# Patient Record
Sex: MALE | Race: WHITE | NOT HISPANIC OR LATINO | Employment: FULL TIME | URBAN - METROPOLITAN AREA
[De-identification: names, ages, dates, MRNs, and addresses within clinical notes are randomized per-mention and may not be internally consistent; named-entity substitution may affect disease eponyms.]

---

## 2023-02-22 PROBLEM — M54.10 RADICULAR LOW BACK PAIN: Status: ACTIVE | Noted: 2023-02-22

## 2023-02-22 PROBLEM — R49.0 HOARSENESS: Status: ACTIVE | Noted: 2023-02-22

## 2023-02-22 PROBLEM — R91.8 LUNG NODULES: Status: ACTIVE | Noted: 2023-02-22

## 2023-02-22 PROBLEM — M62.89 MUSCLE TIGHTNESS: Status: ACTIVE | Noted: 2023-02-22

## 2023-02-22 PROBLEM — E78.5 HLD (HYPERLIPIDEMIA): Status: ACTIVE | Noted: 2023-02-22

## 2023-02-22 PROBLEM — K21.9 GERD (GASTROESOPHAGEAL REFLUX DISEASE): Status: ACTIVE | Noted: 2023-02-22

## 2023-02-22 PROBLEM — S06.0XAA CONCUSSION: Status: ACTIVE | Noted: 2023-02-22

## 2023-02-22 PROBLEM — S29.012A STRAIN OF THORACIC SPINE: Status: ACTIVE | Noted: 2023-02-22

## 2023-02-22 PROBLEM — L98.9 SKIN LESION: Status: ACTIVE | Noted: 2023-02-22

## 2023-02-22 PROBLEM — M19.90 ARTHRITIS: Status: ACTIVE | Noted: 2023-02-22

## 2023-02-22 PROBLEM — H61.23 BILATERAL IMPACTED CERUMEN: Status: ACTIVE | Noted: 2023-02-22

## 2023-02-22 PROBLEM — R97.20 ELEVATED PSA: Status: ACTIVE | Noted: 2023-02-22

## 2023-02-22 PROBLEM — R25.2 SPASM: Status: ACTIVE | Noted: 2023-02-22

## 2023-02-22 PROBLEM — R42 LOSS OF EQUILIBRIUM: Status: ACTIVE | Noted: 2023-02-22

## 2023-02-22 PROBLEM — R52 PAIN AGGRAVATED BY WALKING: Status: ACTIVE | Noted: 2023-02-22

## 2023-02-22 PROBLEM — G57.10 MERALGIA PARESTHETICA: Status: ACTIVE | Noted: 2023-02-22

## 2023-02-22 PROBLEM — S09.90XA HEAD INJURY: Status: ACTIVE | Noted: 2023-02-22

## 2023-02-22 PROBLEM — R74.8 ELEVATED LIVER ENZYMES: Status: ACTIVE | Noted: 2023-02-22

## 2023-02-22 PROBLEM — M54.16 LUMBAR RADICULOPATHY, ACUTE: Status: ACTIVE | Noted: 2023-02-22

## 2023-02-22 PROBLEM — I73.9 PERIPHERAL VASCULAR DISEASE, UNSPECIFIED (CMS-HCC): Status: ACTIVE | Noted: 2023-02-22

## 2023-02-22 PROBLEM — N52.9 ERECTILE DYSFUNCTION: Status: ACTIVE | Noted: 2023-02-22

## 2023-02-22 PROBLEM — G47.33 OSA ON CPAP: Status: ACTIVE | Noted: 2023-02-22

## 2023-02-22 PROBLEM — R22.9 CALCIFIED NODULE: Status: ACTIVE | Noted: 2023-02-22

## 2023-02-22 PROBLEM — R30.0 DYSURIA: Status: ACTIVE | Noted: 2023-02-22

## 2023-02-22 PROBLEM — I25.10 CAD (CORONARY ARTERY DISEASE): Status: ACTIVE | Noted: 2023-02-22

## 2023-02-22 PROBLEM — I10 HYPERTENSION: Status: ACTIVE | Noted: 2023-02-22

## 2023-02-22 PROBLEM — E66.9 OBESITY (BMI 30-39.9): Status: ACTIVE | Noted: 2023-02-22

## 2023-02-22 PROBLEM — H81.10 BPV (BENIGN POSITIONAL VERTIGO): Status: ACTIVE | Noted: 2023-02-22

## 2023-02-22 PROBLEM — L30.9 HAND ECZEMA: Status: ACTIVE | Noted: 2023-02-22

## 2023-02-22 PROBLEM — R55 SYNCOPE: Status: ACTIVE | Noted: 2023-02-22

## 2023-02-22 PROBLEM — R93.1 ELEVATED CORONARY ARTERY CALCIUM SCORE: Status: ACTIVE | Noted: 2023-02-22

## 2023-02-22 PROBLEM — M54.59 OTHER LOW BACK PAIN: Status: ACTIVE | Noted: 2023-02-22

## 2023-02-22 PROBLEM — M62.830 SPASM OF LUMBAR PARASPINOUS MUSCLE: Status: ACTIVE | Noted: 2023-02-22

## 2023-02-22 PROBLEM — R79.89 ELEVATED LFTS: Status: ACTIVE | Noted: 2023-02-22

## 2023-02-22 RX ORDER — BETAMETHASONE DIPROPIONATE 0.5 MG/G
CREAM TOPICAL 2 TIMES DAILY
COMMUNITY
Start: 2019-11-05

## 2023-02-22 RX ORDER — ATORVASTATIN CALCIUM 80 MG/1
1 TABLET, FILM COATED ORAL DAILY
COMMUNITY
Start: 2019-10-23 | End: 2023-10-25 | Stop reason: SDUPTHER

## 2023-02-22 RX ORDER — LISINOPRIL 10 MG/1
1 TABLET ORAL DAILY
COMMUNITY
Start: 2020-02-04 | End: 2023-10-25 | Stop reason: DRUGHIGH

## 2023-02-22 RX ORDER — OMEPRAZOLE 20 MG/1
CAPSULE, DELAYED RELEASE ORAL
COMMUNITY
Start: 2020-09-30

## 2023-02-22 RX ORDER — MELOXICAM 15 MG/1
1 TABLET ORAL DAILY
COMMUNITY
Start: 2022-10-04 | End: 2023-04-13 | Stop reason: ALTCHOICE

## 2023-02-22 RX ORDER — NAPROXEN SODIUM 220 MG/1
1 TABLET, FILM COATED ORAL DAILY
COMMUNITY

## 2023-02-22 RX ORDER — CYCLOBENZAPRINE HCL 10 MG
1 TABLET ORAL 3 TIMES DAILY PRN
COMMUNITY
Start: 2022-03-22 | End: 2024-04-29 | Stop reason: SDUPTHER

## 2023-04-13 ENCOUNTER — OFFICE VISIT (OUTPATIENT)
Dept: PRIMARY CARE | Facility: CLINIC | Age: 68
End: 2023-04-13
Payer: COMMERCIAL

## 2023-04-13 VITALS
OXYGEN SATURATION: 97 % | HEIGHT: 74 IN | RESPIRATION RATE: 14 BRPM | HEART RATE: 62 BPM | WEIGHT: 307 LBS | DIASTOLIC BLOOD PRESSURE: 82 MMHG | TEMPERATURE: 97.3 F | SYSTOLIC BLOOD PRESSURE: 120 MMHG | BODY MASS INDEX: 39.4 KG/M2

## 2023-04-13 DIAGNOSIS — I73.9 PERIPHERAL VASCULAR DISEASE, UNSPECIFIED (CMS-HCC): ICD-10-CM

## 2023-04-13 DIAGNOSIS — G47.33 OSA ON CPAP: ICD-10-CM

## 2023-04-13 DIAGNOSIS — R25.2 MUSCLE CRAMPS: ICD-10-CM

## 2023-04-13 DIAGNOSIS — Z12.5 PROSTATE CANCER SCREENING: ICD-10-CM

## 2023-04-13 DIAGNOSIS — Z00.00 ROUTINE GENERAL MEDICAL EXAMINATION AT HEALTH CARE FACILITY: ICD-10-CM

## 2023-04-13 DIAGNOSIS — Z12.11 COLON CANCER SCREENING: ICD-10-CM

## 2023-04-13 DIAGNOSIS — I25.10 CORONARY ARTERY DISEASE DUE TO LIPID RICH PLAQUE: ICD-10-CM

## 2023-04-13 DIAGNOSIS — E66.01 CLASS 2 SEVERE OBESITY DUE TO EXCESS CALORIES WITH SERIOUS COMORBIDITY AND BODY MASS INDEX (BMI) OF 39.0 TO 39.9 IN ADULT (MULTI): ICD-10-CM

## 2023-04-13 DIAGNOSIS — Z00.00 WELLNESS EXAMINATION: Primary | ICD-10-CM

## 2023-04-13 DIAGNOSIS — I25.83 CORONARY ARTERY DISEASE DUE TO LIPID RICH PLAQUE: ICD-10-CM

## 2023-04-13 PROBLEM — N52.9 ERECTILE DYSFUNCTION: Status: RESOLVED | Noted: 2023-02-22 | Resolved: 2023-04-13

## 2023-04-13 PROBLEM — S06.0XAA CONCUSSION: Status: RESOLVED | Noted: 2023-02-22 | Resolved: 2023-04-13

## 2023-04-13 PROBLEM — R79.89 ELEVATED LFTS: Status: RESOLVED | Noted: 2023-02-22 | Resolved: 2023-04-13

## 2023-04-13 PROBLEM — R42 LOSS OF EQUILIBRIUM: Status: RESOLVED | Noted: 2023-02-22 | Resolved: 2023-04-13

## 2023-04-13 PROBLEM — R52 PAIN AGGRAVATED BY WALKING: Status: RESOLVED | Noted: 2023-02-22 | Resolved: 2023-04-13

## 2023-04-13 PROBLEM — E66.9 OBESITY (BMI 30-39.9): Status: RESOLVED | Noted: 2023-02-22 | Resolved: 2023-04-13

## 2023-04-13 PROBLEM — R30.0 DYSURIA: Status: RESOLVED | Noted: 2023-02-22 | Resolved: 2023-04-13

## 2023-04-13 PROBLEM — M48.10 DISH (DIFFUSE IDIOPATHIC SKELETAL HYPEROSTOSIS): Status: ACTIVE | Noted: 2023-04-13

## 2023-04-13 PROBLEM — R97.20 ELEVATED PSA: Status: RESOLVED | Noted: 2023-02-22 | Resolved: 2023-04-13

## 2023-04-13 PROBLEM — M62.89 MUSCLE TIGHTNESS: Status: RESOLVED | Noted: 2023-02-22 | Resolved: 2023-04-13

## 2023-04-13 PROBLEM — R49.0 HOARSENESS: Status: RESOLVED | Noted: 2023-02-22 | Resolved: 2023-04-13

## 2023-04-13 PROBLEM — R55 SYNCOPE: Status: RESOLVED | Noted: 2023-02-22 | Resolved: 2023-04-13

## 2023-04-13 PROBLEM — L98.9 SKIN LESION: Status: RESOLVED | Noted: 2023-02-22 | Resolved: 2023-04-13

## 2023-04-13 PROBLEM — M54.16 LUMBAR RADICULOPATHY, ACUTE: Status: RESOLVED | Noted: 2023-02-22 | Resolved: 2023-04-13

## 2023-04-13 PROBLEM — H61.23 BILATERAL IMPACTED CERUMEN: Status: RESOLVED | Noted: 2023-02-22 | Resolved: 2023-04-13

## 2023-04-13 PROBLEM — R22.9 CALCIFIED NODULE: Status: RESOLVED | Noted: 2023-02-22 | Resolved: 2023-04-13

## 2023-04-13 PROBLEM — R74.8 ELEVATED LIVER ENZYMES: Status: RESOLVED | Noted: 2023-02-22 | Resolved: 2023-04-13

## 2023-04-13 PROBLEM — M54.59 OTHER LOW BACK PAIN: Status: RESOLVED | Noted: 2023-02-22 | Resolved: 2023-04-13

## 2023-04-13 PROBLEM — M54.10 RADICULAR LOW BACK PAIN: Status: RESOLVED | Noted: 2023-02-22 | Resolved: 2023-04-13

## 2023-04-13 PROBLEM — R91.8 LUNG NODULES: Status: RESOLVED | Noted: 2023-02-22 | Resolved: 2023-04-13

## 2023-04-13 PROCEDURE — 3074F SYST BP LT 130 MM HG: CPT | Performed by: INTERNAL MEDICINE

## 2023-04-13 PROCEDURE — 1160F RVW MEDS BY RX/DR IN RCRD: CPT | Performed by: INTERNAL MEDICINE

## 2023-04-13 PROCEDURE — 1036F TOBACCO NON-USER: CPT | Performed by: INTERNAL MEDICINE

## 2023-04-13 PROCEDURE — 1159F MED LIST DOCD IN RCRD: CPT | Performed by: INTERNAL MEDICINE

## 2023-04-13 PROCEDURE — 99397 PER PM REEVAL EST PAT 65+ YR: CPT | Performed by: INTERNAL MEDICINE

## 2023-04-13 PROCEDURE — 1170F FXNL STATUS ASSESSED: CPT | Performed by: INTERNAL MEDICINE

## 2023-04-13 PROCEDURE — 3008F BODY MASS INDEX DOCD: CPT | Performed by: INTERNAL MEDICINE

## 2023-04-13 PROCEDURE — 3079F DIAST BP 80-89 MM HG: CPT | Performed by: INTERNAL MEDICINE

## 2023-04-13 ASSESSMENT — PATIENT HEALTH QUESTIONNAIRE - PHQ9
2. FEELING DOWN, DEPRESSED OR HOPELESS: NOT AT ALL
10. IF YOU CHECKED OFF ANY PROBLEMS, HOW DIFFICULT HAVE THESE PROBLEMS MADE IT FOR YOU TO DO YOUR WORK, TAKE CARE OF THINGS AT HOME, OR GET ALONG WITH OTHER PEOPLE: SOMEWHAT DIFFICULT
1. LITTLE INTEREST OR PLEASURE IN DOING THINGS: NOT AT ALL
SUM OF ALL RESPONSES TO PHQ9 QUESTIONS 1 AND 2: 1
SUM OF ALL RESPONSES TO PHQ9 QUESTIONS 1 AND 2: 1
2. FEELING DOWN, DEPRESSED OR HOPELESS: SEVERAL DAYS
1. LITTLE INTEREST OR PLEASURE IN DOING THINGS: SEVERAL DAYS

## 2023-04-13 ASSESSMENT — ACTIVITIES OF DAILY LIVING (ADL)
BATHING: INDEPENDENT
MANAGING_FINANCES: INDEPENDENT
TAKING_MEDICATION: INDEPENDENT
DRESSING: INDEPENDENT
GROCERY_SHOPPING: INDEPENDENT
DOING_HOUSEWORK: INDEPENDENT

## 2023-04-13 ASSESSMENT — ENCOUNTER SYMPTOMS
DEPRESSION: 1
LOSS OF SENSATION IN FEET: 1
OCCASIONAL FEELINGS OF UNSTEADINESS: 0

## 2023-04-13 NOTE — PATIENT INSTRUCTIONS
Check labs  Check magnesium  Start coenzyme q10  Colonoscopy  Call  with any problems or questions.   Follow up in 6 months

## 2023-04-13 NOTE — PROGRESS NOTES
"Subjective    Lj Bhardwaj is a 68 y.o. male who presents for Medicare Annual Wellness Visit Subsequent.  HPI  Up to date on vaccines/colonoscopy  He is not on medicare he has primary private insurance  Unsure if he has a living will   Has easy bleeding/bruising  C/o arthritis   Still going through vestibular rehab  No chest pain.  The physical therapy helped.      Uses cpap nightly  Review of Systems      Objective     /82 (BP Location: Left arm, Patient Position: Sitting, BP Cuff Size: Adult)   Pulse 62   Temp 36.3 °C (97.3 °F) (Skin)   Resp 14   Ht 1.88 m (6' 2\")   Wt 139 kg (307 lb)   SpO2 97%   BMI 39.42 kg/m²    Physical Exam  Vitals and nursing note reviewed.   Constitutional:       Appearance: Normal appearance.   Neck:      Thyroid: No thyroid mass or thyromegaly.   Cardiovascular:      Rate and Rhythm: Normal rate and regular rhythm.      Pulses: Normal pulses.      Heart sounds: Normal heart sounds.   Pulmonary:      Effort: Pulmonary effort is normal.      Breath sounds: Normal breath sounds.   Abdominal:      General: Abdomen is flat. Bowel sounds are normal.      Palpations: Abdomen is soft.   Musculoskeletal:      Cervical back: Normal range of motion and neck supple.   Neurological:      General: No focal deficit present.      Mental Status: He is alert.   Psychiatric:         Mood and Affect: Mood normal.       Health Maintenance Due   Topic Date Due    Yearly Adult Physical  Never done    Hepatitis C Screening  Never done    COVID-19 Vaccine (5 - Booster for Pfizer series) 11/11/2022          Assessment/Plan   Problem List Items Addressed This Visit          Nervous    NICOLAS on CPAP       Circulatory    Peripheral vascular disease, unspecified (CMS/HCC)     Other Visit Diagnoses       Wellness examination    -  Primary    Routine general medical examination at health care facility        Colon cancer screening        Muscle cramps            Stable based on symptoms. Continue " established treatment plan and follow up at least yearly  Recommend compression stalkings  Add coeznyme q 10  Pt states they are using >4 hours a night more than 70% of the nights. Pt has noticed a significant  Improvement in symptoms.   We discussed glp/gip therapy and dietary changes

## 2023-04-26 ENCOUNTER — TELEPHONE (OUTPATIENT)
Dept: PRIMARY CARE | Facility: CLINIC | Age: 68
End: 2023-04-26
Payer: COMMERCIAL

## 2023-05-05 ENCOUNTER — LAB (OUTPATIENT)
Dept: LAB | Facility: LAB | Age: 68
End: 2023-05-05
Payer: COMMERCIAL

## 2023-05-05 DIAGNOSIS — R25.2 MUSCLE CRAMPS: ICD-10-CM

## 2023-05-05 DIAGNOSIS — Z12.5 PROSTATE CANCER SCREENING: ICD-10-CM

## 2023-05-05 DIAGNOSIS — Z00.00 ROUTINE GENERAL MEDICAL EXAMINATION AT HEALTH CARE FACILITY: ICD-10-CM

## 2023-05-05 LAB
ALANINE AMINOTRANSFERASE (SGPT) (U/L) IN SER/PLAS: 48 U/L (ref 10–52)
ALBUMIN (G/DL) IN SER/PLAS: 4.3 G/DL (ref 3.4–5)
ALKALINE PHOSPHATASE (U/L) IN SER/PLAS: 79 U/L (ref 33–136)
ANION GAP IN SER/PLAS: 12 MMOL/L (ref 10–20)
ASPARTATE AMINOTRANSFERASE (SGOT) (U/L) IN SER/PLAS: 32 U/L (ref 9–39)
BILIRUBIN TOTAL (MG/DL) IN SER/PLAS: 1.1 MG/DL (ref 0–1.2)
CALCIUM (MG/DL) IN SER/PLAS: 9.3 MG/DL (ref 8.6–10.3)
CARBON DIOXIDE, TOTAL (MMOL/L) IN SER/PLAS: 28 MMOL/L (ref 21–32)
CHLORIDE (MMOL/L) IN SER/PLAS: 103 MMOL/L (ref 98–107)
CHOLESTEROL (MG/DL) IN SER/PLAS: 120 MG/DL (ref 0–199)
CHOLESTEROL IN HDL (MG/DL) IN SER/PLAS: 33.1 MG/DL
CHOLESTEROL/HDL RATIO: 3.6
CREATININE (MG/DL) IN SER/PLAS: 1.04 MG/DL (ref 0.5–1.3)
ERYTHROCYTE DISTRIBUTION WIDTH (RATIO) BY AUTOMATED COUNT: 13.3 % (ref 11.5–14.5)
ERYTHROCYTE MEAN CORPUSCULAR HEMOGLOBIN CONCENTRATION (G/DL) BY AUTOMATED: 31.9 G/DL (ref 32–36)
ERYTHROCYTE MEAN CORPUSCULAR VOLUME (FL) BY AUTOMATED COUNT: 89 FL (ref 80–100)
ERYTHROCYTES (10*6/UL) IN BLOOD BY AUTOMATED COUNT: 5.39 X10E12/L (ref 4.5–5.9)
GFR MALE: 78 ML/MIN/1.73M2
GLUCOSE (MG/DL) IN SER/PLAS: 86 MG/DL (ref 74–99)
HEMATOCRIT (%) IN BLOOD BY AUTOMATED COUNT: 47.9 % (ref 41–52)
HEMOGLOBIN (G/DL) IN BLOOD: 15.3 G/DL (ref 13.5–17.5)
LDL: 70 MG/DL (ref 0–99)
LEUKOCYTES (10*3/UL) IN BLOOD BY AUTOMATED COUNT: 6.4 X10E9/L (ref 4.4–11.3)
MAGNESIUM (MG/DL) IN SER/PLAS: 2.02 MG/DL (ref 1.6–2.4)
PLATELETS (10*3/UL) IN BLOOD AUTOMATED COUNT: 250 X10E9/L (ref 150–450)
POTASSIUM (MMOL/L) IN SER/PLAS: 4.5 MMOL/L (ref 3.5–5.3)
PROSTATE SPECIFIC AG (NG/ML) IN SER/PLAS: 1.23 NG/ML (ref 0–4)
PROTEIN TOTAL: 7.4 G/DL (ref 6.4–8.2)
SODIUM (MMOL/L) IN SER/PLAS: 138 MMOL/L (ref 136–145)
TRIGLYCERIDE (MG/DL) IN SER/PLAS: 85 MG/DL (ref 0–149)
UREA NITROGEN (MG/DL) IN SER/PLAS: 21 MG/DL (ref 6–23)
VLDL: 17 MG/DL (ref 0–40)

## 2023-05-05 PROCEDURE — 84153 ASSAY OF PSA TOTAL: CPT

## 2023-05-05 PROCEDURE — 85027 COMPLETE CBC AUTOMATED: CPT

## 2023-05-05 PROCEDURE — 80061 LIPID PANEL: CPT

## 2023-05-05 PROCEDURE — 36415 COLL VENOUS BLD VENIPUNCTURE: CPT

## 2023-05-05 PROCEDURE — 80053 COMPREHEN METABOLIC PANEL: CPT

## 2023-05-05 PROCEDURE — 83735 ASSAY OF MAGNESIUM: CPT

## 2023-05-08 NOTE — RESULT ENCOUNTER NOTE
His labs are good. His magnesium is ok so if he wants to try otc magnesium supplement for his muscle cramps he can see if that helps.

## 2023-05-09 ENCOUNTER — TELEPHONE (OUTPATIENT)
Dept: PRIMARY CARE | Facility: CLINIC | Age: 68
End: 2023-05-09

## 2023-05-09 NOTE — TELEPHONE ENCOUNTER
----- Message from China Townsend CMA sent at 5/8/2023  8:43 AM EDT -----    Lmtcb    ----- Message -----  From: Jennifer Weir DO  Sent: 5/8/2023   8:06 AM EDT  To: China Townsend CMA    His labs are good. His magnesium is ok so if he wants to try otc magnesium supplement for his muscle cramps he can see if that helps.

## 2023-10-25 ENCOUNTER — OFFICE VISIT (OUTPATIENT)
Dept: PRIMARY CARE | Facility: CLINIC | Age: 68
End: 2023-10-25
Payer: COMMERCIAL

## 2023-10-25 VITALS
SYSTOLIC BLOOD PRESSURE: 146 MMHG | DIASTOLIC BLOOD PRESSURE: 80 MMHG | BODY MASS INDEX: 37.99 KG/M2 | WEIGHT: 296 LBS | HEIGHT: 74 IN | TEMPERATURE: 97.3 F | RESPIRATION RATE: 14 BRPM | HEART RATE: 80 BPM

## 2023-10-25 DIAGNOSIS — M48.10 DISH (DIFFUSE IDIOPATHIC SKELETAL HYPEROSTOSIS): ICD-10-CM

## 2023-10-25 DIAGNOSIS — E78.2 MIXED HYPERLIPIDEMIA: ICD-10-CM

## 2023-10-25 DIAGNOSIS — R49.9 CHANGE IN VOICE: Primary | ICD-10-CM

## 2023-10-25 DIAGNOSIS — M25.551 PAIN OF RIGHT HIP: ICD-10-CM

## 2023-10-25 DIAGNOSIS — I10 PRIMARY HYPERTENSION: ICD-10-CM

## 2023-10-25 PROCEDURE — 99214 OFFICE O/P EST MOD 30 MIN: CPT | Performed by: INTERNAL MEDICINE

## 2023-10-25 PROCEDURE — 1160F RVW MEDS BY RX/DR IN RCRD: CPT | Performed by: INTERNAL MEDICINE

## 2023-10-25 PROCEDURE — 3079F DIAST BP 80-89 MM HG: CPT | Performed by: INTERNAL MEDICINE

## 2023-10-25 PROCEDURE — 3008F BODY MASS INDEX DOCD: CPT | Performed by: INTERNAL MEDICINE

## 2023-10-25 PROCEDURE — 3077F SYST BP >= 140 MM HG: CPT | Performed by: INTERNAL MEDICINE

## 2023-10-25 PROCEDURE — 1159F MED LIST DOCD IN RCRD: CPT | Performed by: INTERNAL MEDICINE

## 2023-10-25 PROCEDURE — 1036F TOBACCO NON-USER: CPT | Performed by: INTERNAL MEDICINE

## 2023-10-25 RX ORDER — LISINOPRIL 10 MG/1
10 TABLET ORAL DAILY
Qty: 90 TABLET | Refills: 3 | Status: CANCELLED | OUTPATIENT
Start: 2023-10-25 | End: 2024-10-24

## 2023-10-25 RX ORDER — ATORVASTATIN CALCIUM 80 MG/1
80 TABLET, FILM COATED ORAL DAILY
Qty: 90 TABLET | Refills: 3 | Status: SHIPPED | OUTPATIENT
Start: 2023-10-25 | End: 2024-01-23 | Stop reason: SDUPTHER

## 2023-10-25 RX ORDER — LISINOPRIL 40 MG/1
40 TABLET ORAL DAILY
Qty: 90 TABLET | Refills: 3 | Status: SHIPPED | OUTPATIENT
Start: 2023-10-25 | End: 2024-01-23 | Stop reason: SDUPTHER

## 2023-10-25 NOTE — PROGRESS NOTES
"Subjective    Lj Bhardwaj is a 68 y.o. male who presents for Hypertension.  HPI  6 month follow up htn  Continues to use CPAP nightly. Uses for naps as well.   He has no chest pain or sob.   His weight is up.   He never smoked    C/o back stiffness, right leg pain. Starts after being on feet for about 50 minutes. His  Low back gets tight and then goes around his right   He does     Concerns with voice changes. By afternoon voice sounds gravely   Needs referral for hearing test, audiologist.     Should he get COVID vaccine    Review of Systems   All other systems reviewed and are negative.        Objective     /80 (BP Location: Left arm, Patient Position: Sitting, BP Cuff Size: Adult)   Pulse 80   Temp 36.3 °C (97.3 °F) (Skin)   Resp 14   Ht 1.88 m (6' 2\")   Wt 134 kg (296 lb)   BMI 38.00 kg/m²    Physical Exam  Vitals reviewed.   Constitutional:       General: He is not in acute distress.     Appearance: Normal appearance.   Cardiovascular:      Rate and Rhythm: Normal rate and regular rhythm.      Pulses: Normal pulses.      Heart sounds: Normal heart sounds.   Pulmonary:      Effort: Pulmonary effort is normal.      Breath sounds: Normal breath sounds.   Abdominal:      Tenderness: There is no abdominal tenderness.   Musculoskeletal:         General: No swelling.      Comments: Right hip tenderness with external rotation.  Increase tension and tenderness   Skin:     General: Skin is warm and dry.   Neurological:      Mental Status: He is alert.       Health Maintenance Due   Topic Date Due    Hepatitis C Screening  Never done    Diabetes Screening  Never done    COVID-19 Vaccine (5 - Pfizer series) 11/11/2022          Assessment/Plan   Problem List Items Addressed This Visit       HLD (hyperlipidemia)    Relevant Medications    atorvastatin (Lipitor) 80 mg tablet    Hypertension    Relevant Medications    lisinopril 40 mg tablet    Other Relevant Orders    Basic Metabolic Panel    DISH (diffuse " idiopathic skeletal hyperostosis)    Relevant Orders    Referral to Physical Therapy     Other Visit Diagnoses       Change in voice    -  Primary    Relevant Orders    Referral to ENT        Long discussion on diet weight and blood pressure  Increase lisinopril to 40 recheck labs in  a month  Refer to ent for vocal cord evaluation  Recommend physical therapy  Check xray right hip

## 2023-10-30 ENCOUNTER — OFFICE VISIT (OUTPATIENT)
Dept: OTOLARYNGOLOGY | Facility: CLINIC | Age: 68
End: 2023-10-30
Payer: COMMERCIAL

## 2023-10-30 ENCOUNTER — ANCILLARY PROCEDURE (OUTPATIENT)
Dept: RADIOLOGY | Facility: CLINIC | Age: 68
End: 2023-10-30
Payer: COMMERCIAL

## 2023-10-30 VITALS — BODY MASS INDEX: 38.5 KG/M2 | WEIGHT: 300 LBS | HEIGHT: 74 IN

## 2023-10-30 DIAGNOSIS — M25.551 PAIN OF RIGHT HIP: ICD-10-CM

## 2023-10-30 DIAGNOSIS — R49.0 DYSPHONIA: Primary | ICD-10-CM

## 2023-10-30 DIAGNOSIS — J38.3 VOCAL FOLD ATROPHY: ICD-10-CM

## 2023-10-30 PROCEDURE — 99214 OFFICE O/P EST MOD 30 MIN: CPT | Performed by: OTOLARYNGOLOGY

## 2023-10-30 PROCEDURE — 73502 X-RAY EXAM HIP UNI 2-3 VIEWS: CPT | Mod: RIGHT SIDE | Performed by: RADIOLOGY

## 2023-10-30 PROCEDURE — 1160F RVW MEDS BY RX/DR IN RCRD: CPT | Performed by: OTOLARYNGOLOGY

## 2023-10-30 PROCEDURE — 73502 X-RAY EXAM HIP UNI 2-3 VIEWS: CPT | Mod: RT,FY

## 2023-10-30 PROCEDURE — 1159F MED LIST DOCD IN RCRD: CPT | Performed by: OTOLARYNGOLOGY

## 2023-10-30 PROCEDURE — 3008F BODY MASS INDEX DOCD: CPT | Performed by: OTOLARYNGOLOGY

## 2023-10-30 PROCEDURE — 31579 LARYNGOSCOPY TELESCOPIC: CPT | Performed by: OTOLARYNGOLOGY

## 2023-10-30 PROCEDURE — 1036F TOBACCO NON-USER: CPT | Performed by: OTOLARYNGOLOGY

## 2023-10-30 PROCEDURE — 99204 OFFICE O/P NEW MOD 45 MIN: CPT | Performed by: OTOLARYNGOLOGY

## 2023-10-30 ASSESSMENT — PATIENT HEALTH QUESTIONNAIRE - PHQ9
SUM OF ALL RESPONSES TO PHQ9 QUESTIONS 1 AND 2: 0
1. LITTLE INTEREST OR PLEASURE IN DOING THINGS: NOT AT ALL
2. FEELING DOWN, DEPRESSED OR HOPELESS: NOT AT ALL

## 2023-10-30 NOTE — PROGRESS NOTES
Patient: Lj Bhardwaj   MRN: [id number] YOB: 1955   Sex: male Age: 68 y.o.  Date of Service: 10/30/2023       ASSESSMENT AND PLAN  I discussed the findings with Lj Bhardwaj and have recommended the followin. Dysphonia in professional voice user, laryngoscopy significant for mild vocal fold atrophy, mild right VF hypomobility, muscle tension likely secondary. No masses or lesions.  - Hydration, humidification  - Personal steamer prior to broadcasts  - Refer to voice therapy with SLP  - Follow-up with me 3-4 months, can consider trial injection laryngoplasty if no improvements, further cross sectional imaging for paresis      CHIEF COMPLAINT  Chief Complaint   Patient presents with    New Patient Visit       HISTORY OF PRESENT ILLNESS  Lj Bhardwaj is a 68 y.o. male referred by Jennifer Vidales, * for evaluation of dysphonia.  The patient is a . 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 Going on for at least several months, may have been a little gradual, did not think much about it. His wife noticed it. Thinks he has been trying to be more forceful with his voice as he has noticed that his colleagues' voices have gotten a little thin with age. No pain when he speaks, has not lost his voice. Not otherwise talkative. No coughing. No dyspnea or dysphagia.     He has a dog at home. No issues with allergies now but did as a kid. History of NICOLAS on CPAP, longstanding. GERD well controlled on omeprazole. Non smoker.       ADDITIONAL HISTORY  Past Medical History  He has a past medical history of Personal history of colonic polyps (10/08/2020) and Personal history of other diseases of the musculoskeletal system and connective tissue (2020). Surgical History  He has a past surgical history that includes Other surgical history (10/06/2019).   Social History  He reports that he has never  "smoked. He has never used smokeless tobacco. He reports current alcohol use. He reports that he does not use drugs. Allergies  Latex     Family History  Family History   Problem Relation Name Age of Onset    Stroke Mother      Heart disease Father      Heart disease Brother          REVIEW OF SYSTEMS  All 10 systems were reviewed and negative except for above.      PHYSICAL EXAM  ENT Physical Exam   GENERAL: Well-nourished and developed, alert and appropriate, no distress, voice [id number]  RESPIRATORY: Breathing quietly, no stridor  EYES:  Pupils reactive, sclera clear, external ocular muscles intact, no nystagmus.    EARS:  Pinnae normal. External auditory canals clear and tympanic membranes intact.  NOSE:  No anterior lesions, masses or polyps.  ORAL CAVITY/OROPHARYNX:  Buccal mucosa is moist without lesions or masses, tongue midline and palate elevates symmetrically.  NECK:  Soft. There is no lymphadenopathy or thyromegaly.    NEUROLOGIC:  Cranial nerves II-XII grossly intact.       Last Recorded Vitals  Height 1.88 m (6' 2\"), weight 136 kg (300 lb).    RESULTS    Patient Reported Outcome Measures  N/A    Laboratory, Radiology, and Pathology  I personally reviewed the following results, with the following interpretation:   CT face/spine noncon 3/19/22 - no obvious masses or lesions but evaluated limited due to non contrasted scan      PROCEDURES  Procedures     Flexible Fiberoptic Laryngoscopy with Stroboscopy    Patient failed a mirror exam due to limitations of equipment and the need for stroboscopy to assess glottic vibration and closure.     PREOPERATIVE DIAGNOSIS: Dysphonia    POSTOPERATIVE DIAGNOSIS: Same    PROCEDURE:  Strobovideolaryngoscopy    ANESTHESIA:  Topical    COMPLICATIONS:  None    SPECIMENS:  None    PROCEDURE IN DETAIL: The patient was seated in an upright position.  The nasal cavity was topically decongested and anesthetized.  The stroboscopic microphone was held to the neck at the level of " the larynx.  The distal chip video laryngoscope was passed through the nasal cavity.  The nasal cavity and nasopharynx were within normal limits.  The base of tongue revealed no lesions or masses. The following findings on stroboscopy were noted:      Appearance of pharynx/larynx/Other Structural Lesions: small right vallecular cyst   Vocal Fold Mobility               Right VF: hypomobile               Left VF: mobile   TVF Appearance               Edema/Erythema: none               Lesions/vibratory margin irregularities: mild atrophy bilaterally, some mucus on superior surface   Glottic Closure Pattern: complete with hyperfunction    Vibration:               Phase: symmetric    Periodicity: regular                Amplitude: normal                Waveform: normal     Muscle Tension Patterns:  lateral false fold and AP   Other abnormal findings:     The patient tolerated the procedure well.     ----------------------------------------------------------------------  Shannan Tinsley MD, MAEd    [facility]  Department of Otolaryngology - Head and Neck Surgery  [facility]    The total time I spent in care of this patient today (excluding time spent on other billable services) is as follows:    Time Spent  Prep time on day of patient encounter: 10 minutes  Time spent directly with patient, family or caregiver: 25 minutes  Additional Time Spent on Patient Care Activities: 5 minutes  Documentation Time: 5 minutes  Other Time Spent: 0 minutes  Total: 45 minutes

## 2023-11-01 ENCOUNTER — TELEPHONE (OUTPATIENT)
Dept: PRIMARY CARE | Facility: CLINIC | Age: 68
End: 2023-11-01

## 2023-11-01 NOTE — TELEPHONE ENCOUNTER
----- Message from Jennifer Weir DO sent at 11/1/2023  8:15 AM EDT -----  His xrays shows mild bilateral hip arthritis

## 2023-11-08 ENCOUNTER — EVALUATION (OUTPATIENT)
Dept: SPEECH THERAPY | Facility: CLINIC | Age: 68
End: 2023-11-08
Payer: COMMERCIAL

## 2023-11-08 DIAGNOSIS — R49.9 HOARSENESS OR CHANGING VOICE: Primary | ICD-10-CM

## 2023-11-08 DIAGNOSIS — R49.0 DYSPHONIA: ICD-10-CM

## 2023-11-08 DIAGNOSIS — J38.3 VOCAL FOLD ATROPHY: ICD-10-CM

## 2023-11-08 DIAGNOSIS — R49.0 MUSCLE TENSION DYSPHONIA: ICD-10-CM

## 2023-11-08 PROCEDURE — 92524 BEHAVRAL QUALIT ANALYS VOICE: CPT | Mod: GN

## 2023-11-08 PROCEDURE — 92507 TX SP LANG VOICE COMM INDIV: CPT | Mod: GN

## 2023-11-08 ASSESSMENT — PAIN - FUNCTIONAL ASSESSMENT: PAIN_FUNCTIONAL_ASSESSMENT: 0-10

## 2023-11-08 ASSESSMENT — PAIN SCALES - GENERAL: PAINLEVEL_OUTOF10: 0 - NO PAIN

## 2023-11-08 NOTE — PROGRESS NOTES
Speech-Language Pathology    Voice Evaluation       Patient Name: Lj Bhardwaj  MRN: [id number]  : 1955  Today's Date: 23     Time Calculation  Start Time: 1400  Stop Time: 1530  Time Calculation (min): 90 min           Current Problem:  Patient reports that he uses his voice daily as a . He stated that his wife has noticed significant raspiness to his voice over the past 6 months.  He also has noticed that he has fatigue in his voice and increased hoarse vocal quality later in the day after working.    Diagnosis: Muscle tension dysphonia, hoarse vocal quality     Voice Assessment:   Oral motor examination:  Lj's oral motor examination was within functional limits for function and structure.  No facial asymmetry or weakness noted.  Dentition was normal limits.  Patient was able to complete diadochokinetic testing was within normal limits.  Upper back and neck musculature had significantly increased muscle tone for upper back and cervical musculature.  Patient was able to respond well to manual therapy techniques with minimal discomfort reported for upper back myofascial release techniques.     Patient was able to count from 1-21 using 2 breaths and recite the alphabet using 2 breaths.  Patient had periodic aphonic breaks while counting and reciting the alphabet.  He was able to read the rainbow passage with mild hoarse/strained vocal quality.  During the assessment the patient had evidence of clavicular breathing patterns.      Patient was able to sustain the /a/ phoneme for 15 seconds, the /i/ phoneme for 17 seconds, and the /u/ phoneme for 16 seconds.  He was able to complete pitch changes for both incremental and glide pitch changes with decreased pitch range and pitch control.  He was able to complete loudness change techniques with adequate loudness range and mild aphonic breaks for decreased loudness.       An S to Z ratio was obtained.  The patient was able to sustain the /S/  "phoneme for 9 seconds and the /Z/ phoneme for 12 seconds.  Patient's S/Z ratio was 0.833.  A normal adult male or female should be able to sustain an S or Z phoneme for an average of 20-25 seconds.  This indicates a mild-moderate deficit in breath support for voice.    Patient was given a vocally abusive checklist sheet to complete at home and bring to his next treatment session.  Patient was also instructed to increase his H2O intake to improve vocal hygiene.  He was also given a abdominal breathing techniques sheet to take home and practice 2 to 3 times per day.  Patient was able to demonstrate 65% efficiency completing the task in the supine position, 10% in the seated position and 25% in the standing position.    VHI-10  VHI-10  My voice makes it difficult for people to hear me.: Almost never  People have difficulty understanding me in a noisy room.: Sometimes  My voice difficulties restrict my personal and social life.: Sometimes  I feel left out of the conversations because of my voice: Sometimes  My voice problem causes me to lose income.: Never  I feel as though I have to strain to produce voice.: Sometimes  The clarity of my voice is unpredictable.: Sometimes  My voice problem upsets me.: Sometimes  My voice makes me feel handicapped.: Never  People ask, \"What's wrong with your voice?\": Sometimes  VHI-10 Total Score: 15        Voice Plan of Care:  Frequency: 1x per week  Duration: 90 days  Number of Visits: 12  Skilled SLP warranted for voice treatment due to patient current vocal deficits, need for education and completion of vocal techniques to improve. Without skilled intervention, patient will not improve.   Recommendations for therapeutic interventions: Speech/Voice exercises  Prognosis: Good  Discussed Plan of Care: Patient  Patient/Caregiver Demonstrated Understanding: yes  Risk and Benefits Discussed with Patient/Caregiver/Other: yes     Goals:  1.  Pt will be able to tolerate manual therapy " techniques to upper back and neck musculature.   2.  Pt will be able to complete breath support techniques with 90% accuracy.   3.  Pt will be able to complete vocal warmup techniques with 90% accuracy.   4.  Pt will be able to complete pitch change techniques with 90% accuracy.   5.  Pt will be able to complete a home program 2-3 times per day.      Subjective:  Lj Bhardwaj was seen for a voice evaluation at [facility] on 11/08/23. Patient was seen from 2:00 PM until 3:30 PM for a 30 minute evaluation and 60 minute treatment session.      General Visit Information:  Living Environment: Home  Arrival: Independent  Ordering Physician: Laureano  Reason for Referral: dysphonia, muscle tension dysphonia  Past Medical History Relevant to Rehab:     Certification Period Start Date: 11/08/23  Certification Period End Date: 02/06/2024  Total Number of Visits : 1  Date of Onset: 05/08/2023       Pain Assessment:  Pain Assessment: 0-10  Pain Score: 0 - No pain      Education:  Individual(s) Educated: Patient  Verbal Education: Results of testing, Exercises  Written Education : Abdominal breathing  Response to Education: Verbalized understanding  Patient/Caregiver Verbalized Understanding and Agreement: Yes    Treatment:   Treatment provided: Extensive treatment session completed today post evaluation due to patient being the only patient scheduled today and patient had additional time available to participate in the initial treatment session.  During the treatment session patient was able to tolerate myofascial release techniques to upper back and neck musculature with mild discomfort reported for upper back myofascial release techniques.  Patient was given stretching techniques to complete 2-3 times per day including head turn, head tilt, and had not x2 with a 5-second hold for each.  Next, patient was given straw breathing techniques to practice 2-3 times per day.  Patient was able to perform sustained  exhalation through the straw initially for 5.3 seconds with intermittent air blowing through a straw.  After cueing by the therapist patient was able to sustain exhalation for 19.4 seconds with sustained exhalation through the straw.  Lastly, patient was given vocal warm-ups techniques to complete.  He was able to complete them plus vowel sound productions with improved vocal quality and H+ vowel sounds with decreased vocal quality.  Patient was educated regarding vocal hygiene techniques and reflux precautions.  Patient stated that he does have difficulty with reflux and throat clearing/coughing.  He was given information regarding throat clearing/coughing cessation techniques.  Patient was also given the following home program training sheets including abdominal breathing, stretching techniques, straw breathing techniques and vocal warm-ups techniques.    Voice Therapy: Home Program    Straw Breathing:  Blow out through a straw as long as possible 5 times 1-2x per day.    Stretching:  Head turn, head nod, head tilt x 2 for 5 seconds each.      Vocal Exercises: (5 times each)    /m/ hold and sustain for about 3-5 seconds or until you lose breath support    /m/ +/eeeeee/             /me,me,me/    /m/ + /ayyyyyyy/      /may,may,may/    /m/ +/ahhhhhh/        /alex,alex,alex/    /m/ + /ooooo/         /moo,moo,moo/    /h…..eeee/       /he,he,he/    /h…..aahh/      /hah,hah,hah/    /h…ayyyy/     /hay, hay, hay/    /h…ooooo/  /ilsa, ilsa, ilsa/

## 2023-11-08 NOTE — Clinical Note
November 8, 2023     Patient: Lj Bhardwaj   YOB: 1955   Date of Visit: 11/8/2023       To Whom It May Concern:    It is my medical opinion that Lj Bhardwaj {Work release (duty restriction):08048}.    If you have any questions or concerns, please don't hesitate to call.         Sincerely,        Steven Mercado, SLP    CC: No Recipients

## 2023-11-08 NOTE — LETTER
November 10, 2023    Shannan Tinsley MD  [id number]  Department Of Otolaryngology  [facility] [address]    Patient: Lj Bhardwaj   YOB: 1955   Date of Visit: 11/8/2023       Dear Shannan Tinsley Md  [address]  Department [place] [address]    The attached plan of care is being sent to you because your patient’s medical reimbursement requires that you certify the plan of care. Your signature is required to allow uninterrupted insurance coverage.      You may indicate your approval by signing below and faxing this form back to us at Dept Fax: [phone].    Please call Dept: [phone] with any questions or concerns.    Thank you for this referral,        GUERA Robbins  [address]  [facility]  [address] [facility] [address]    Payer: Payor: [employer] [place] / Plan: [employer] / Product Type: *No Product type* /                                                                         Date:     Dear GUERA Robbins,     Re: Mr. Lj Bhardwaj, MRN:[id number]    I certify that I have reviewed the attached plan of care and it is medically necessary for Mr. Lj Bhardwaj (1955) who is under my care.          ______________________________________                    _________________  Provider name and credentials                                           Date and time                                                                                      The following plan is in draft form.  Please refer to the current version for the most up-to-date information.                Plan of Care 11/8/23   Effective from: 11/8/2023  Effective to: 2/6/2024    Draft  Plan ID: [id number]               Participants as of 11/10/2023      Name Type Comments Contact Info    Shannan Tinsely MD Referring Provider  [phone]    GUERA Robbins Speech Language Pathologist  [phone]          Last Plan  Note       Author: Steven Mercado, SLP Status: Signed Last edited: 2023  2:00 PM         Speech-Language Pathology    Voice Evaluation       Patient Name: Lj Bhardwaj  MRN: [id number]  : 1955  Today's Date: 23     Time Calculation  Start Time: 1400  Stop Time: 1530  Time Calculation (min): 90 min           Current Problem:  Patient reports that he uses his voice daily as a . He stated that his wife has noticed significant raspiness to his voice over the past 6 months.  He also has noticed that he has fatigue in his voice and increased hoarse vocal quality later in the day after working.    Diagnosis: Muscle tension dysphonia, hoarse vocal quality     Voice Assessment:   Oral motor examination:  Lj's oral motor examination was within functional limits for function and structure.  No facial asymmetry or weakness noted.  Dentition was normal limits.  Patient was able to complete diadochokinetic testing was within normal limits.  Upper back and neck musculature had significantly increased muscle tone for upper back and cervical musculature.  Patient was able to respond well to manual therapy techniques with minimal discomfort reported for upper back myofascial release techniques.     Patient was able to count from 1-21 using 2 breaths and recite the alphabet using 2 breaths.  Patient had periodic aphonic breaks while counting and reciting the alphabet.  He was able to read the rainbow passage with mild hoarse/strained vocal quality.  During the assessment the patient had evidence of clavicular breathing patterns.      Patient was able to sustain the /a/ phoneme for 15 seconds, the /i/ phoneme for 17 seconds, and the /u/ phoneme for 16 seconds.  He was able to complete pitch changes for both incremental and glide pitch changes with decreased pitch range and pitch control.  He was able to complete loudness change techniques with adequate loudness range and mild aphonic breaks for  "decreased loudness.       An S to Z ratio was obtained.  The patient was able to sustain the /S/ phoneme for 9 seconds and the /Z/ phoneme for 12 seconds.  Patient's S/Z ratio was 0.833.  A normal adult male or female should be able to sustain an S or Z phoneme for an average of 20-25 seconds.  This indicates a mild-moderate deficit in breath support for voice.    Patient was given a vocally abusive checklist sheet to complete at home and bring to his next treatment session.  Patient was also instructed to increase his H2O intake to improve vocal hygiene.  He was also given a abdominal breathing techniques sheet to take home and practice 2 to 3 times per day.  Patient was able to demonstrate 65% efficiency completing the task in the supine position, 10% in the seated position and 25% in the standing position.    VHI-10  VHI-10  My voice makes it difficult for people to hear me.: Almost never  People have difficulty understanding me in a noisy room.: Sometimes  My voice difficulties restrict my personal and social life.: Sometimes  I feel left out of the conversations because of my voice: Sometimes  My voice problem causes me to lose income.: Never  I feel as though I have to strain to produce voice.: Sometimes  The clarity of my voice is unpredictable.: Sometimes  My voice problem upsets me.: Sometimes  My voice makes me feel handicapped.: Never  People ask, \"What's wrong with your voice?\": Sometimes  VHI-10 Total Score: 15        Voice Plan of Care:  Frequency: 1x per week  Duration: 90 days  Number of Visits: 12  Skilled SLP warranted for voice treatment due to patient current vocal deficits, need for education and completion of vocal techniques to improve. Without skilled intervention, patient will not improve.   Recommendations for therapeutic interventions: Speech/Voice exercises  Prognosis: Good  Discussed Plan of Care: Patient  Patient/Caregiver Demonstrated Understanding: yes  Risk and Benefits Discussed " with Patient/Caregiver/Other: yes     Goals:  1.  Pt will be able to tolerate manual therapy techniques to upper back and neck musculature.   2.  Pt will be able to complete breath support techniques with 90% accuracy.   3.  Pt will be able to complete vocal warmup techniques with 90% accuracy.   4.  Pt will be able to complete pitch change techniques with 90% accuracy.   5.  Pt will be able to complete a home program 2-3 times per day.      Subjective:  Lj Bhardwaj was seen for a voice evaluation at [facility] on 11/08/23. Patient was seen from 2:00 PM until 3:30 PM for a 30 minute evaluation and 60 minute treatment session.      General Visit Information:  Living Environment: Home  Arrival: Independent  Ordering Physician: Laureano  Reason for Referral: dysphonia, muscle tension dysphonia  Past Medical History Relevant to Rehab:     Certification Period Start Date: 11/08/23  Certification Period End Date: 02/06/2024  Total Number of Visits : 1  Date of Onset: 05/08/2023       Pain Assessment:  Pain Assessment: 0-10  Pain Score: 0 - No pain      Education:  Individual(s) Educated: Patient  Verbal Education: Results of testing, Exercises  Written Education : Abdominal breathing  Response to Education: Verbalized understanding  Patient/Caregiver Verbalized Understanding and Agreement: Yes    Treatment:   Treatment provided: Extensive treatment session completed today post evaluation due to patient being the only patient scheduled today and patient had additional time available to participate in the initial treatment session.  During the treatment session patient was able to tolerate myofascial release techniques to upper back and neck musculature with mild discomfort reported for upper back myofascial release techniques.  Patient was given stretching techniques to complete 2-3 times per day including head turn, head tilt, and had not x2 with a 5-second hold for each.  Next, patient was given straw  breathing techniques to practice 2-3 times per day.  Patient was able to perform sustained exhalation through the straw initially for 5.3 seconds with intermittent air blowing through a straw.  After cueing by the therapist patient was able to sustain exhalation for 19.4 seconds with sustained exhalation through the straw.  Lastly, patient was given vocal warm-ups techniques to complete.  He was able to complete them plus vowel sound productions with improved vocal quality and H+ vowel sounds with decreased vocal quality.  Patient was educated regarding vocal hygiene techniques and reflux precautions.  Patient stated that he does have difficulty with reflux and throat clearing/coughing.  He was given information regarding throat clearing/coughing cessation techniques.  Patient was also given the following home program training sheets including abdominal breathing, stretching techniques, straw breathing techniques and vocal warm-ups techniques.    Voice Therapy: Home Program    Straw Breathing:  Blow out through a straw as long as possible 5 times 1-2x per day.    Stretching:  Head turn, head nod, head tilt x 2 for 5 seconds each.      Vocal Exercises: (5 times each)    /m/ hold and sustain for about 3-5 seconds or until you lose breath support    /m/ +/eeeeee/             /me,me,me/    /m/ + /ayyyyyyy/      /may,may,may/    /m/ +/ahhhhhh/        /alex,alex,alex/    /m/ + /ooooo/         /moo,moo,moo/    /h…..eeee/       /he,he,he/    /h…..aahh/      /hah,hah,hah/    /h…ayyyy/     /hay, hay, hay/    /h…ooooo/  /ilsa, ilsa, ilsa/

## 2023-11-08 NOTE — Clinical Note
November 8, 2023     Patient: Lj Bhardwaj   YOB: 1955   Date of Visit: 11/8/2023       To Whom it May Concern:    Lj Bhardwaj was seen in my clinic on 11/8/2023. He {Return to school/sport:46811}.    If you have any questions or concerns, please don't hesitate to call.         Sincerely,          Steven Mercado, SLP        CC: No Recipients

## 2023-11-15 ENCOUNTER — TREATMENT (OUTPATIENT)
Dept: SPEECH THERAPY | Facility: CLINIC | Age: 68
End: 2023-11-15
Payer: COMMERCIAL

## 2023-11-15 DIAGNOSIS — R49.0 MUSCLE TENSION DYSPHONIA: Primary | ICD-10-CM

## 2023-11-15 DIAGNOSIS — R49.9 HOARSENESS OR CHANGING VOICE: ICD-10-CM

## 2023-11-15 PROCEDURE — 92507 TX SP LANG VOICE COMM INDIV: CPT | Mod: GN

## 2023-11-15 ASSESSMENT — PAIN SCALES - GENERAL: PAINLEVEL_OUTOF10: 0 - NO PAIN

## 2023-11-15 ASSESSMENT — PAIN - FUNCTIONAL ASSESSMENT: PAIN_FUNCTIONAL_ASSESSMENT: 0-10

## 2023-11-15 NOTE — PROGRESS NOTES
Speech-Language Pathology    Outpatient Speech-Language Pathology Treatment     Patient Name: Lj Bhardwaj  MRN: [id number]  Today's Date: 11/15/2023     Time Calculation  Start Time: 1540  Stop Time: 1625  Time Calculation (min): 45 min      Current Problem:   1. Muscle tension dysphonia        2. 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  He feels that his voice has improved slightly since his initial evaluation and treatment session last week.  He did say that he has not practiced his straw breathing techniques but has been practicing his abdominal breathing techniques.  Patient was able to tolerate myofascial release techniques and was able to complete stretching techniques with increased range of motion and mildly decreased tone.  Patient was able to complete straw breathing techniques with blowing through the straw for an average of 20 to 22 seconds for 5 repetitions.  He was able to complete vocal warm-ups today with minimal hoarse vocal quality with h+ vowel sounds.  When producing a.m. plus vowel sounds initially he had good vocal quality.  After going back to them plus vowel sounds patient had minimal coarse vocal quality.  Patient was encouraged to come up with 10 functional phrases between now and his next session.  He was also encouraged to practice his home program 2-3 times per day.        Plan:  SLP Frequency: 1x per week  Duration: 90 days    Subjective   Patient was seen for a 45-minute voice treatment session today from 1540 until 1625  20 5 PM.    General Visit Information:   Certification Period Start Date: 11/08/23  Certification Period End Date: 02/06/24  Total Number of Visits : 2    Pain Assessment:   Pain Assessment: 0-10  Pain Score: 0 - No pain0    Objective   1.  Pt will be able to tolerate manual therapy techniques to upper back and neck musculature.   2.  Pt will be able to  complete breath support techniques with 90% accuracy.   3.  Pt will be able to complete vocal warmup techniques with 90% accuracy.   4.  Pt will be able to complete pitch change techniques with 90% accuracy.   5.  Pt will be able to complete a home program 2-3 times per day.      Outpatient Education:     Patient educated regarding continuing to practice abdominal breathing techniques, straw breathing techniques and vocal warm-ups.  He was also encouraged to practice his stretching techniques 2-3 times per day.  Patient in agreement with plan.

## 2023-11-17 NOTE — PROGRESS NOTES
I was the supervising physician in the delivery of the service. I agree with the plan of care as documented.    Shannan Tinsley MD

## 2023-11-29 ENCOUNTER — TREATMENT (OUTPATIENT)
Dept: SPEECH THERAPY | Facility: CLINIC | Age: 68
End: 2023-11-29
Payer: COMMERCIAL

## 2023-11-29 DIAGNOSIS — R49.0 MUSCLE TENSION DYSPHONIA: Primary | ICD-10-CM

## 2023-11-29 DIAGNOSIS — R49.9 HOARSENESS OR CHANGING VOICE: ICD-10-CM

## 2023-11-29 PROCEDURE — 92507 TX SP LANG VOICE COMM INDIV: CPT | Mod: GN

## 2023-11-29 NOTE — PROGRESS NOTES
Speech-Language Pathology    Outpatient Speech-Language Pathology Treatment     Patient Name: Lj Bhardwaj  MRN: [id number]  Today's Date: 11/29/2023   Time In: 1535   Time Out: 1620   Total Time: 45 minutes         Current Problem:   1. Muscle tension dysphonia        2. Hoarseness or changing voice              SLP Assessment:   Pt reported a positive and noticeable change in voice by self and partner at home. 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 Pt tolerated myofascial release at start of session for 15 minutes; pt reported being sore the day after previous sessions secondary to stretching and myofascial release. Pt reported completing straw breathing exercises but not timing how long each was in duration. Increased ROM observed when completing head stretches (tilt, turn, and up and down). Straw breathing exercises completed x6 with an range of 15-19 seconds. Pt completed vocal warmup with little to no observed hoarseness in vocal quality with /m/ and /h/ productions. Consistency observed with productions. Pt generated 5 functional phrases to use between now and the next session. Pt encouraged to complete vocal warmups, straw breathing, and head stretches 2-3x/day to improve vocal quality and breath support.        Plan:   Recommend continued participation in skilled speech services x1/week for 45-minutes to address vocal quality and breath support.       Subjective   Pt arrived on time for 3:30pm session until 4:20 for a 45-minute session. Pt reported noticing an improvement in vocal quality. Pt reported fewer comments from spouse regarding voice and vocal quality.    Certification Period Start Date: 11/08/23  Certification Period End Date: 02/06/24  Total Number of Visits : 3    Objective   1.  Pt will be able to tolerate manual therapy techniques to upper back and neck musculature.   2.  Pt will be able to complete  breath support techniques with 90% accuracy.   3.  Pt will be able to complete vocal warmup techniques with 90% accuracy.   4.  Pt will be able to complete pitch change techniques with 90% accuracy.   5.  Pt will be able to complete a home program 2-3 times per day.        Outpatient Education:   Educated pt regarding continuation of straw breathing, head stretches, and vocal warmups daily. Pt educated on importance of completing functional phrases.

## 2023-12-04 ENCOUNTER — CLINICAL SUPPORT (OUTPATIENT)
Dept: PRIMARY CARE | Facility: CLINIC | Age: 68
End: 2023-12-04

## 2023-12-04 VITALS — TEMPERATURE: 97.2 F | SYSTOLIC BLOOD PRESSURE: 138 MMHG | DIASTOLIC BLOOD PRESSURE: 80 MMHG | HEART RATE: 65 BPM

## 2023-12-04 DIAGNOSIS — I10 PRIMARY HYPERTENSION: ICD-10-CM

## 2023-12-06 ENCOUNTER — APPOINTMENT (OUTPATIENT)
Dept: SPEECH THERAPY | Facility: CLINIC | Age: 68
End: 2023-12-06
Payer: COMMERCIAL

## 2023-12-13 ENCOUNTER — APPOINTMENT (OUTPATIENT)
Dept: SPEECH THERAPY | Facility: CLINIC | Age: 68
End: 2023-12-13
Payer: COMMERCIAL

## 2024-01-23 DIAGNOSIS — E78.2 MIXED HYPERLIPIDEMIA: ICD-10-CM

## 2024-01-23 DIAGNOSIS — I10 PRIMARY HYPERTENSION: ICD-10-CM

## 2024-01-23 RX ORDER — LISINOPRIL 40 MG/1
40 TABLET ORAL DAILY
Qty: 90 TABLET | Refills: 3 | Status: SHIPPED | OUTPATIENT
Start: 2024-01-23 | End: 2025-01-22

## 2024-01-23 RX ORDER — ATORVASTATIN CALCIUM 80 MG/1
80 TABLET, FILM COATED ORAL DAILY
Qty: 90 TABLET | Refills: 3 | Status: SHIPPED | OUTPATIENT
Start: 2024-01-23 | End: 2025-01-22

## 2024-04-29 ENCOUNTER — OFFICE VISIT (OUTPATIENT)
Dept: PRIMARY CARE | Facility: CLINIC | Age: 69
End: 2024-04-29
Payer: MEDICARE

## 2024-04-29 VITALS
BODY MASS INDEX: 37.35 KG/M2 | WEIGHT: 291 LBS | HEART RATE: 68 BPM | OXYGEN SATURATION: 97 % | SYSTOLIC BLOOD PRESSURE: 130 MMHG | HEIGHT: 74 IN | TEMPERATURE: 97.2 F | DIASTOLIC BLOOD PRESSURE: 68 MMHG | RESPIRATION RATE: 14 BRPM

## 2024-04-29 DIAGNOSIS — Z00.00 ROUTINE GENERAL MEDICAL EXAMINATION AT HEALTH CARE FACILITY: Primary | Chronic | ICD-10-CM

## 2024-04-29 DIAGNOSIS — I73.9 PERIPHERAL VASCULAR DISEASE, UNSPECIFIED (CMS-HCC): ICD-10-CM

## 2024-04-29 DIAGNOSIS — M62.830 LUMBAR PARASPINAL MUSCLE SPASM: ICD-10-CM

## 2024-04-29 DIAGNOSIS — I10 HTN (HYPERTENSION), BENIGN: ICD-10-CM

## 2024-04-29 DIAGNOSIS — I10 PRIMARY HYPERTENSION: ICD-10-CM

## 2024-04-29 DIAGNOSIS — E66.01 CLASS 2 SEVERE OBESITY DUE TO EXCESS CALORIES WITH SERIOUS COMORBIDITY AND BODY MASS INDEX (BMI) OF 37.0 TO 37.9 IN ADULT (MULTI): ICD-10-CM

## 2024-04-29 DIAGNOSIS — Z12.5 PROSTATE CANCER SCREENING: ICD-10-CM

## 2024-04-29 DIAGNOSIS — Z12.11 COLON CANCER SCREENING: ICD-10-CM

## 2024-04-29 DIAGNOSIS — Z00.00 WELLNESS EXAMINATION: ICD-10-CM

## 2024-04-29 PROBLEM — S09.90XA HEAD INJURY: Status: RESOLVED | Noted: 2023-02-22 | Resolved: 2024-04-29

## 2024-04-29 PROBLEM — E66.812 CLASS 2 SEVERE OBESITY DUE TO EXCESS CALORIES WITH SERIOUS COMORBIDITY AND BODY MASS INDEX (BMI) OF 39.0 TO 39.9 IN ADULT: Status: ACTIVE | Noted: 2024-04-29

## 2024-04-29 PROBLEM — H81.10 BPV (BENIGN POSITIONAL VERTIGO): Status: RESOLVED | Noted: 2023-02-22 | Resolved: 2024-04-29

## 2024-04-29 PROCEDURE — 1170F FXNL STATUS ASSESSED: CPT | Performed by: INTERNAL MEDICINE

## 2024-04-29 PROCEDURE — 99213 OFFICE O/P EST LOW 20 MIN: CPT | Performed by: INTERNAL MEDICINE

## 2024-04-29 PROCEDURE — 3008F BODY MASS INDEX DOCD: CPT | Performed by: INTERNAL MEDICINE

## 2024-04-29 PROCEDURE — 3075F SYST BP GE 130 - 139MM HG: CPT | Performed by: INTERNAL MEDICINE

## 2024-04-29 PROCEDURE — 1160F RVW MEDS BY RX/DR IN RCRD: CPT | Performed by: INTERNAL MEDICINE

## 2024-04-29 PROCEDURE — 1036F TOBACCO NON-USER: CPT | Performed by: INTERNAL MEDICINE

## 2024-04-29 PROCEDURE — 1123F ACP DISCUSS/DSCN MKR DOCD: CPT | Performed by: INTERNAL MEDICINE

## 2024-04-29 PROCEDURE — 1159F MED LIST DOCD IN RCRD: CPT | Performed by: INTERNAL MEDICINE

## 2024-04-29 PROCEDURE — 3078F DIAST BP <80 MM HG: CPT | Performed by: INTERNAL MEDICINE

## 2024-04-29 PROCEDURE — G0439 PPPS, SUBSEQ VISIT: HCPCS | Performed by: INTERNAL MEDICINE

## 2024-04-29 RX ORDER — CYCLOBENZAPRINE HCL 10 MG
10 TABLET ORAL 3 TIMES DAILY PRN
Qty: 30 TABLET | Refills: 0 | Status: SHIPPED | OUTPATIENT
Start: 2024-04-29

## 2024-04-29 RX ORDER — DIAZEPAM 5 MG/1
5 TABLET ORAL EVERY 8 HOURS PRN
Qty: 10 TABLET | Refills: 0 | Status: SHIPPED | OUTPATIENT
Start: 2024-04-29 | End: 2024-07-28

## 2024-04-29 ASSESSMENT — ACTIVITIES OF DAILY LIVING (ADL)
BATHING: INDEPENDENT
DOING_HOUSEWORK: INDEPENDENT
MANAGING_FINANCES: INDEPENDENT
GROCERY_SHOPPING: INDEPENDENT
DRESSING: INDEPENDENT
TAKING_MEDICATION: INDEPENDENT

## 2024-04-29 ASSESSMENT — PATIENT HEALTH QUESTIONNAIRE - PHQ9
1. LITTLE INTEREST OR PLEASURE IN DOING THINGS: NOT AT ALL
2. FEELING DOWN, DEPRESSED OR HOPELESS: NOT AT ALL
SUM OF ALL RESPONSES TO PHQ9 QUESTIONS 1 AND 2: 0

## 2024-04-29 ASSESSMENT — ENCOUNTER SYMPTOMS
LOSS OF SENSATION IN FEET: 0
DEPRESSION: 1
OCCASIONAL FEELINGS OF UNSTEADINESS: 0

## 2024-04-29 NOTE — PROGRESS NOTES
"Subjective    Lj Bhardwaj is a 69 y.o. male who presents for Medicare Annual Wellness Visit Subsequent.  HPI    Colonoscopy ordered not scheduled   Utd vaccines   He did vocal therapy.   He gets severe muscle spasm of his lumbar spine at times.  He is trying to be more active.   He was retired and he is not sure how he likes it.  He did see dr. Chambers last year and will see him in May.  No chest pain.     Review of Systems   All other systems reviewed and are negative.        Objective     /68 (BP Location: Left arm, Patient Position: Sitting, BP Cuff Size: Adult)   Pulse 68   Temp 36.2 °C (97.2 °F) (Skin)   Resp 14   Ht 1.88 m (6' 2\")   Wt 132 kg (291 lb)   SpO2 97%   BMI 37.36 kg/m²    Physical Exam  Health Maintenance Due   Topic Date Due    Medicare Annual Wellness Visit (AWV)  Never done    Hepatitis C Screening  Never done    Diabetes Screening  Never done    RSV Pregnant patients and/or  patients aged 60+ years (1 - 1-dose 60+ series) Never done    COVID-19 Vaccine (7 - 2023-24 season) 03/04/2024          Assessment/Plan   Problem List Items Addressed This Visit       Hypertension    Peripheral vascular disease, unspecified (CMS-HCC)    Class 2 severe obesity due to excess calories with serious comorbidity and body mass index (BMI) of 39.0 to 39.9 in adult (Multi)     Other Visit Diagnoses       Routine general medical examination at health care facility  (Chronic)   -  Primary    Lumbar paraspinal muscle spasm        Relevant Medications    cyclobenzaprine (Flexeril) 10 mg tablet    diazePAM (Valium) 5 mg tablet    Wellness examination        Relevant Orders    CBC    Comprehensive Metabolic Panel    Lipid Panel    Prostate cancer screening        Relevant Orders    Prostate Specific Antigen    Colon cancer screening        Relevant Orders    Colonoscopy Screening; Average Risk Patient    HTN (hypertension), benign        Relevant Orders    CBC        Check labs.   Colonoscopy   Do not drive " or drink alcohol with valium. Use for severe spasm only  Bp is good.   Weight is down  Call  with any problems or questions.   Follow up in 6 months.

## 2024-05-10 ENCOUNTER — LAB (OUTPATIENT)
Dept: LAB | Facility: LAB | Age: 69
End: 2024-05-10
Payer: MEDICARE

## 2024-05-10 DIAGNOSIS — I10 PRIMARY HYPERTENSION: ICD-10-CM

## 2024-05-10 DIAGNOSIS — I10 HTN (HYPERTENSION), BENIGN: ICD-10-CM

## 2024-05-10 DIAGNOSIS — Z00.00 WELLNESS EXAMINATION: ICD-10-CM

## 2024-05-10 DIAGNOSIS — Z12.5 PROSTATE CANCER SCREENING: ICD-10-CM

## 2024-05-10 LAB
ALBUMIN SERPL BCP-MCNC: 4.2 G/DL (ref 3.4–5)
ALP SERPL-CCNC: 85 U/L (ref 33–136)
ALT SERPL W P-5'-P-CCNC: 44 U/L (ref 10–52)
ANION GAP SERPL CALC-SCNC: 11 MMOL/L (ref 10–20)
AST SERPL W P-5'-P-CCNC: 29 U/L (ref 9–39)
BILIRUB SERPL-MCNC: 0.8 MG/DL (ref 0–1.2)
BUN SERPL-MCNC: 18 MG/DL (ref 6–23)
CALCIUM SERPL-MCNC: 8.9 MG/DL (ref 8.6–10.3)
CHLORIDE SERPL-SCNC: 106 MMOL/L (ref 98–107)
CHOLEST SERPL-MCNC: 115 MG/DL (ref 0–199)
CHOLESTEROL/HDL RATIO: 3.5
CO2 SERPL-SCNC: 27 MMOL/L (ref 21–32)
CREAT SERPL-MCNC: 0.93 MG/DL (ref 0.5–1.3)
EGFRCR SERPLBLD CKD-EPI 2021: 88 ML/MIN/1.73M*2
ERYTHROCYTE [DISTWIDTH] IN BLOOD BY AUTOMATED COUNT: 13.3 % (ref 11.5–14.5)
GLUCOSE SERPL-MCNC: 96 MG/DL (ref 74–99)
HCT VFR BLD AUTO: 47.2 % (ref 41–52)
HDLC SERPL-MCNC: 32.8 MG/DL
HGB BLD-MCNC: 15.7 G/DL (ref 13.5–17.5)
LDLC SERPL CALC-MCNC: 68 MG/DL
MCH RBC QN AUTO: 28.6 PG (ref 26–34)
MCHC RBC AUTO-ENTMCNC: 33.3 G/DL (ref 32–36)
MCV RBC AUTO: 86 FL (ref 80–100)
NON HDL CHOLESTEROL: 82 MG/DL (ref 0–149)
NRBC BLD-RTO: 0 /100 WBCS (ref 0–0)
PLATELET # BLD AUTO: 234 X10*3/UL (ref 150–450)
POTASSIUM SERPL-SCNC: 4.7 MMOL/L (ref 3.5–5.3)
PROT SERPL-MCNC: 7.5 G/DL (ref 6.4–8.2)
PSA SERPL-MCNC: 1.35 NG/ML
RBC # BLD AUTO: 5.48 X10*6/UL (ref 4.5–5.9)
SODIUM SERPL-SCNC: 139 MMOL/L (ref 136–145)
TRIGL SERPL-MCNC: 70 MG/DL (ref 0–149)
VLDL: 14 MG/DL (ref 0–40)
WBC # BLD AUTO: 5.4 X10*3/UL (ref 4.4–11.3)

## 2024-05-10 PROCEDURE — 80053 COMPREHEN METABOLIC PANEL: CPT

## 2024-05-10 PROCEDURE — 80061 LIPID PANEL: CPT

## 2024-05-10 PROCEDURE — 36415 COLL VENOUS BLD VENIPUNCTURE: CPT

## 2024-05-10 PROCEDURE — 85027 COMPLETE CBC AUTOMATED: CPT

## 2024-05-10 PROCEDURE — G0103 PSA SCREENING: HCPCS

## 2024-09-12 ENCOUNTER — TELEPHONE (OUTPATIENT)
Dept: GASTROENTEROLOGY | Facility: CLINIC | Age: 69
End: 2024-09-12
Payer: MEDICARE

## 2025-01-08 ENCOUNTER — APPOINTMENT (OUTPATIENT)
Dept: PRIMARY CARE | Facility: CLINIC | Age: 70
End: 2025-01-08
Payer: MEDICARE

## 2025-01-08 VITALS
TEMPERATURE: 98.2 F | SYSTOLIC BLOOD PRESSURE: 138 MMHG | HEART RATE: 62 BPM | OXYGEN SATURATION: 99 % | DIASTOLIC BLOOD PRESSURE: 88 MMHG | HEIGHT: 74 IN | BODY MASS INDEX: 36.57 KG/M2 | WEIGHT: 285 LBS | RESPIRATION RATE: 14 BRPM

## 2025-01-08 DIAGNOSIS — E66.812 CLASS 2 SEVERE OBESITY DUE TO EXCESS CALORIES WITH SERIOUS COMORBIDITY AND BODY MASS INDEX (BMI) OF 36.0 TO 36.9 IN ADULT: ICD-10-CM

## 2025-01-08 DIAGNOSIS — E78.2 MIXED HYPERLIPIDEMIA: ICD-10-CM

## 2025-01-08 DIAGNOSIS — Z12.11 COLON CANCER SCREENING: Primary | ICD-10-CM

## 2025-01-08 DIAGNOSIS — E66.01 CLASS 2 SEVERE OBESITY DUE TO EXCESS CALORIES WITH SERIOUS COMORBIDITY AND BODY MASS INDEX (BMI) OF 36.0 TO 36.9 IN ADULT: ICD-10-CM

## 2025-01-08 DIAGNOSIS — I10 PRIMARY HYPERTENSION: ICD-10-CM

## 2025-01-08 PROCEDURE — 1160F RVW MEDS BY RX/DR IN RCRD: CPT | Performed by: INTERNAL MEDICINE

## 2025-01-08 PROCEDURE — 99214 OFFICE O/P EST MOD 30 MIN: CPT | Performed by: INTERNAL MEDICINE

## 2025-01-08 PROCEDURE — 1159F MED LIST DOCD IN RCRD: CPT | Performed by: INTERNAL MEDICINE

## 2025-01-08 PROCEDURE — 3075F SYST BP GE 130 - 139MM HG: CPT | Performed by: INTERNAL MEDICINE

## 2025-01-08 PROCEDURE — 3079F DIAST BP 80-89 MM HG: CPT | Performed by: INTERNAL MEDICINE

## 2025-01-08 PROCEDURE — 1123F ACP DISCUSS/DSCN MKR DOCD: CPT | Performed by: INTERNAL MEDICINE

## 2025-01-08 PROCEDURE — 3008F BODY MASS INDEX DOCD: CPT | Performed by: INTERNAL MEDICINE

## 2025-01-08 PROCEDURE — 1036F TOBACCO NON-USER: CPT | Performed by: INTERNAL MEDICINE

## 2025-01-08 RX ORDER — LISINOPRIL 40 MG/1
40 TABLET ORAL DAILY
Qty: 90 TABLET | Refills: 3 | Status: SHIPPED | OUTPATIENT
Start: 2025-01-08 | End: 2026-01-08

## 2025-01-08 RX ORDER — SOD SULF/POT CHLORIDE/MAG SULF 1.479 G
24 TABLET ORAL ONCE
Qty: 24 TABLET | Refills: 0 | Status: SHIPPED | OUTPATIENT
Start: 2025-01-08 | End: 2025-01-08

## 2025-01-08 RX ORDER — ATORVASTATIN CALCIUM 80 MG/1
80 TABLET, FILM COATED ORAL DAILY
Qty: 90 TABLET | Refills: 3 | Status: SHIPPED | OUTPATIENT
Start: 2025-01-08 | End: 2026-01-08

## 2025-01-08 NOTE — PROGRESS NOTES
"Subjective    Lj Bhardwaj is a 69 y.o. male who presents for Follow-up.  HPI    Follow up   Scheduled for colonoscopy in March   Needs refills  He is feeling well   His back pain  is about the same. Tight  He has lost some weight. Was walking the dog a lot in the nicer weather  He is partially retired. 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   He is still interested in finding something   He has no chest pain.  Otherwise feeling good        Review of Systems   All other systems reviewed and are negative.        Objective     /88 (BP Location: Left arm, Patient Position: Sitting, BP Cuff Size: Adult)   Pulse 62   Temp 36.8 °C (98.2 °F) (Skin)   Resp 14   Ht 1.88 m (6' 2\")   Wt 129 kg (285 lb)   SpO2 99%   BMI 36.59 kg/m²    Physical Exam  Vitals reviewed.   Constitutional:       General: He is not in acute distress.     Appearance: Normal appearance.   Cardiovascular:      Rate and Rhythm: Normal rate and regular rhythm.      Pulses: Normal pulses.      Heart sounds: Normal heart sounds.   Pulmonary:      Effort: Pulmonary effort is normal.      Breath sounds: Normal breath sounds.   Abdominal:      Tenderness: There is no abdominal tenderness.   Musculoskeletal:         General: No swelling.   Skin:     General: Skin is warm and dry.   Neurological:      Mental Status: He is alert.       Health Maintenance Due   Topic Date Due    Hepatitis C Screening  Never done    Diabetes Screening  Never done    RSV High Risk: (Elderly (60+) or Pregnant Population) (1 - Risk 60-74 years 1-dose series) Never done          Assessment/Plan   Problem List Items Addressed This Visit       HLD (hyperlipidemia)    Relevant Medications    atorvastatin (Lipitor) 80 mg tablet    Hypertension    Relevant Medications    lisinopril 40 mg tablet    Class 2 severe obesity due to excess calories with serious comorbidity and body mass index (BMI) of 39.0 to 39.9 in adult   His blood pressure is upper limit. " Recommend he continue with regular walking this will help with back, weight, and blood pressure.   Refil meds.  Call  with any problems or questions.   Follow up in 6 months.

## 2025-01-28 ENCOUNTER — TELEPHONE (OUTPATIENT)
Dept: GASTROENTEROLOGY | Facility: CLINIC | Age: 70
End: 2025-01-28
Payer: MEDICARE

## 2025-01-29 ENCOUNTER — TELEPHONE (OUTPATIENT)
Dept: GASTROENTEROLOGY | Facility: CLINIC | Age: 70
End: 2025-01-29

## 2025-01-29 NOTE — TELEPHONE ENCOUNTER
2nd attempt to reach patient regarding his colon with Dr. Sanchez- patient was moved to different date per provider availability.

## 2025-02-04 NOTE — TELEPHONE ENCOUNTER
Patient was returning a phone call in order to reschedule colonoscopy scheduled on 3/26/2025. Please review. Thank you.

## 2025-02-13 ENCOUNTER — TELEPHONE (OUTPATIENT)
Dept: GASTROENTEROLOGY | Facility: CLINIC | Age: 70
End: 2025-02-13
Payer: MEDICARE

## 2025-02-13 NOTE — TELEPHONE ENCOUNTER
L/M for patient, inquiring if he still needs his date changed for his upcoming Colonoscopy, left direct number.

## 2025-03-24 ENCOUNTER — APPOINTMENT (OUTPATIENT)
Dept: GASTROENTEROLOGY | Facility: EXTERNAL LOCATION | Age: 70
End: 2025-03-24
Payer: MEDICARE

## 2025-03-26 ENCOUNTER — APPOINTMENT (OUTPATIENT)
Dept: GASTROENTEROLOGY | Facility: EXTERNAL LOCATION | Age: 70
End: 2025-03-26
Payer: MEDICARE

## 2025-05-07 ENCOUNTER — ANESTHESIA EVENT (OUTPATIENT)
Dept: GASTROENTEROLOGY | Facility: EXTERNAL LOCATION | Age: 70
End: 2025-05-07

## 2025-05-15 ENCOUNTER — ANESTHESIA (OUTPATIENT)
Dept: GASTROENTEROLOGY | Facility: EXTERNAL LOCATION | Age: 70
End: 2025-05-15

## 2025-05-15 ENCOUNTER — APPOINTMENT (OUTPATIENT)
Dept: GASTROENTEROLOGY | Facility: EXTERNAL LOCATION | Age: 70
End: 2025-05-15
Payer: MEDICARE

## 2025-05-15 VITALS
TEMPERATURE: 97.7 F | DIASTOLIC BLOOD PRESSURE: 78 MMHG | OXYGEN SATURATION: 98 % | HEART RATE: 55 BPM | BODY MASS INDEX: 35.94 KG/M2 | SYSTOLIC BLOOD PRESSURE: 123 MMHG | RESPIRATION RATE: 14 BRPM | WEIGHT: 280 LBS | HEIGHT: 74 IN

## 2025-05-15 DIAGNOSIS — Z12.11 ENCOUNTER FOR SCREENING FOR MALIGNANT NEOPLASM OF COLON: Primary | ICD-10-CM

## 2025-05-15 DIAGNOSIS — D12.6 COLON ADENOMA: ICD-10-CM

## 2025-05-15 DIAGNOSIS — D12.3 ADENOMATOUS POLYP OF TRANSVERSE COLON: ICD-10-CM

## 2025-05-15 DIAGNOSIS — K22.70 BARRETT'S ESOPHAGUS WITHOUT DYSPLASIA: ICD-10-CM

## 2025-05-15 PROCEDURE — 45385 COLONOSCOPY W/LESION REMOVAL: CPT | Performed by: INTERNAL MEDICINE

## 2025-05-15 RX ORDER — PROPOFOL 10 MG/ML
INJECTION, EMULSION INTRAVENOUS AS NEEDED
Status: DISCONTINUED | OUTPATIENT
Start: 2025-05-15 | End: 2025-05-15

## 2025-05-15 RX ORDER — SODIUM CHLORIDE 9 MG/ML
INJECTION, SOLUTION INTRAVENOUS CONTINUOUS PRN
Status: DISCONTINUED | OUTPATIENT
Start: 2025-05-15 | End: 2025-05-15

## 2025-05-15 RX ORDER — LIDOCAINE HYDROCHLORIDE 20 MG/ML
INJECTION, SOLUTION INFILTRATION; PERINEURAL AS NEEDED
Status: DISCONTINUED | OUTPATIENT
Start: 2025-05-15 | End: 2025-05-15

## 2025-05-15 RX ADMIN — PROPOFOL 50 MG: 10 INJECTION, EMULSION INTRAVENOUS at 08:29

## 2025-05-15 RX ADMIN — PROPOFOL 100 MG: 10 INJECTION, EMULSION INTRAVENOUS at 08:22

## 2025-05-15 RX ADMIN — PROPOFOL 100 MG: 10 INJECTION, EMULSION INTRAVENOUS at 08:23

## 2025-05-15 RX ADMIN — SODIUM CHLORIDE: 9 INJECTION, SOLUTION INTRAVENOUS at 08:22

## 2025-05-15 RX ADMIN — LIDOCAINE HYDROCHLORIDE 2.5 ML: 20 INJECTION, SOLUTION INFILTRATION; PERINEURAL at 08:22

## 2025-05-15 RX ADMIN — PROPOFOL 50 MG: 10 INJECTION, EMULSION INTRAVENOUS at 08:34

## 2025-05-15 RX ADMIN — PROPOFOL 50 MG: 10 INJECTION, EMULSION INTRAVENOUS at 08:27

## 2025-05-15 RX ADMIN — PROPOFOL 50 MG: 10 INJECTION, EMULSION INTRAVENOUS at 08:25

## 2025-05-15 SDOH — HEALTH STABILITY: MENTAL HEALTH: CURRENT SMOKER: 0

## 2025-05-15 ASSESSMENT — PAIN SCALES - GENERAL
PAINLEVEL_OUTOF10: 0 - NO PAIN
PAIN_LEVEL: 0
PAINLEVEL_OUTOF10: 0 - NO PAIN

## 2025-05-15 ASSESSMENT — PAIN - FUNCTIONAL ASSESSMENT
PAIN_FUNCTIONAL_ASSESSMENT: 0-10

## 2025-05-15 ASSESSMENT — COLUMBIA-SUICIDE SEVERITY RATING SCALE - C-SSRS
1. IN THE PAST MONTH, HAVE YOU WISHED YOU WERE DEAD OR WISHED YOU COULD GO TO SLEEP AND NOT WAKE UP?: NO
6. HAVE YOU EVER DONE ANYTHING, STARTED TO DO ANYTHING, OR PREPARED TO DO ANYTHING TO END YOUR LIFE?: NO
2. HAVE YOU ACTUALLY HAD ANY THOUGHTS OF KILLING YOURSELF?: NO

## 2025-05-15 NOTE — ANESTHESIA POSTPROCEDURE EVALUATION
Patient: Lj Bhardwaj    Procedure Summary       Date: 05/15/25 Room / Location: [facility]    Anesthesia Start: 0820 Anesthesia Stop:     Procedure: COLONOSCOPY Diagnosis:       Colon adenoma      Adenomatous polyp of transverse colon      Encounter for screening for malignant neoplasm of colon    Scheduled Providers: Bola Sanchez MD Responsible Provider: KHOA Somers    Anesthesia Type: MAC ASA Status: 2            Anesthesia Type: MAC    Vitals Value Taken Time   /62 05/15/25 08:44   Temp 36.5 °C (97.7 °F) 05/15/25 08:44   Pulse 53 05/15/25 08:44   Resp 14 05/15/25 08:44   SpO2 97 % 05/15/25 08:44       Anesthesia Post Evaluation    Patient location during evaluation: bedside  Patient participation: complete - patient participated  Level of consciousness: awake  Pain score: 0  Pain management: adequate  Airway patency: patent  Cardiovascular status: acceptable  Respiratory status: acceptable  Hydration status: acceptable  Postoperative Nausea and Vomiting: none        No notable events documented.

## 2025-05-15 NOTE — ANESTHESIA PREPROCEDURE EVALUATION
Patient: Lj Bhardwaj    Procedure Information       Date/Time: 05/15/25 0820    Scheduled providers: Bola Sanchez MD    Procedure: COLONOSCOPY    Location: [facility]            Relevant Problems   Anesthesia (within normal limits)      Cardiac   (+) CAD (coronary artery disease)   (+) HLD (hyperlipidemia)   (+) Hypertension   (+) Peripheral vascular disease, unspecified      Pulmonary (within normal limits)      Neuro   (+) Meralgia paresthetica      GI   (+) GERD (gastroesophageal reflux disease)      /Renal (within normal limits)      Liver (within normal limits)      Endocrine   (+) Class 2 severe obesity due to excess calories with serious comorbidity and body mass index (BMI) of 39.0 to 39.9 in adult      Hematology (within normal limits)      Musculoskeletal (within normal limits)      HEENT (within normal limits)      ID (within normal limits)      Skin   (+) Hand eczema      GYN (within normal limits)       Clinical information reviewed:   Tobacco  Allergies  Meds  Problems  Med Hx  Surg Hx   Fam Hx  Soc   Hx        NPO Detail:  NPO/Void Status  Date of Last Liquid: 05/15/25  Time of Last Liquid: 0000  Date of Last Solid: 05/13/25         Physical Exam    Airway  Mallampati: II  TM distance: >3 FB  Neck ROM: full     Cardiovascular   Rhythm: regular  Rate: normal     Dental    Pulmonary Breath sounds clear to auscultation     Abdominal Abdomen: soft  Bowel sounds: normal           Anesthesia Plan    History of general anesthesia?: yes  History of complications of general anesthesia?: no    ASA 2     MAC     The patient is not a current smoker.  Patient was not previously instructed to abstain from smoking on day of procedure.  Education provided regarding risk of obstructive sleep apnea.  intravenous induction   Anesthetic plan and risks discussed with patient.    Plan discussed with CRNA.

## 2025-05-15 NOTE — H&P
Procedure H&P    Patient Profile-Procedures  Name Lj Bhardwaj  Date of Birth 1955  MRN [id number]  Address   [address] [facility] [id number] [facility] [id number]    Primary Phone Number [phone]  Secondary Phone Number    Jennifer Peck    Procedure(s):  Procedures: Colonoscopy  Primary contact name and number   Extended Emergency Contact Information  Primary Emergency Contact: Ashwini Bhardwaj  Home Phone: [phone]  Work Phone: [phone]  Relation: Spouse    General Health  Weight   Vitals:    05/15/25 0723   Weight: 127 kg (280 lb)     BMI Body mass index is 35.95 kg/m².    Allergies  RX Allergies[1]    Past Medical History   Medical History[2]    Provider assessment  Diagnosis: Colon Cancer Screening/Surveillance   Medication Reviewed - yes  Prior to Admission medications    Medication Sig Start Date End Date Taking? Authorizing Provider   aspirin 81 mg chewable tablet Chew 1 tablet (81 mg) once daily.   Yes Historical Provider, MD   atorvastatin (Lipitor) 80 mg tablet Take 1 tablet (80 mg) by mouth once daily. 1/8/25 1/8/26 Yes Jennifer Weir DO   lisinopril 40 mg tablet Take 1 tablet (40 mg) by mouth once daily. 1/8/25 1/8/26 Yes Jennifer Weir DO   omeprazole (PriLOSEC) 20 mg DR capsule Take by mouth once daily. 9/30/20  Yes Historical Provider, MD   cyclobenzaprine (Flexeril) 10 mg tablet Take 1 tablet (10 mg) by mouth 3 times a day as needed for muscle spasms. 4/29/24   Jennifer Weir DO   diazePAM (Valium) 5 mg tablet Take 1 tablet (5 mg) by mouth every 8 hours if needed for muscle spasms. 4/29/24 1/8/25  Jennifer Weir DO   betamethasone dipropionate 0.05 % cream Apply topically twice a day. 11/5/19 5/15/25  Historical Provider, MD       Physical Exam  Vitals:    05/15/25 0723   BP: 169/87   Pulse: 59   Resp: 10   Temp: 36.6 °C (97.9 °F)   SpO2: 97%        General: A&Ox3, NAD.  HEENT: AT/NC.   CV: RRR. No murmur.  Resp: CTA  bilaterally. No wheezing, rhonchi or rales.   GI: Soft, NT/ND. BSx4.  Extrem: No edema. Pulses intact.  Neuro: No focal deficits.   Psych: Normal mood and affect.      Procedure Plan - pre-procedural (re)assesment completed by physician:  discharge/transfer patient when discharge criteria met    ASA status 2  Mallampati score 2    Bola Sanchez MD  5/15/2025 8:20 AM           [1]   Allergies  Allergen Reactions    Latex Unknown   [2]   Past Medical History:  Diagnosis Date    Hypertension     Personal history of colonic polyps 10/08/2020    History of colonic polyps    Personal history of other diseases of the musculoskeletal system and connective tissue 09/30/2020    History of back pain

## 2025-05-15 NOTE — DISCHARGE INSTRUCTIONS

## 2025-05-27 LAB
LABORATORY COMMENT REPORT: NORMAL
PATH REPORT.FINAL DX SPEC: NORMAL
PATH REPORT.GROSS SPEC: NORMAL
PATH REPORT.RELEVANT HX SPEC: NORMAL
PATH REPORT.TOTAL CANCER: NORMAL

## 2025-06-03 ENCOUNTER — ANESTHESIA EVENT (OUTPATIENT)
Dept: GASTROENTEROLOGY | Facility: EXTERNAL LOCATION | Age: 70
End: 2025-06-03

## 2025-06-12 ENCOUNTER — APPOINTMENT (OUTPATIENT)
Dept: GASTROENTEROLOGY | Facility: EXTERNAL LOCATION | Age: 70
End: 2025-06-12
Payer: MEDICARE

## 2025-06-12 ENCOUNTER — ANESTHESIA (OUTPATIENT)
Dept: GASTROENTEROLOGY | Facility: EXTERNAL LOCATION | Age: 70
End: 2025-06-12

## 2025-06-12 VITALS
HEART RATE: 53 BPM | DIASTOLIC BLOOD PRESSURE: 77 MMHG | HEIGHT: 74 IN | TEMPERATURE: 97.9 F | OXYGEN SATURATION: 96 % | SYSTOLIC BLOOD PRESSURE: 111 MMHG | BODY MASS INDEX: 35.94 KG/M2 | RESPIRATION RATE: 20 BRPM | WEIGHT: 280 LBS

## 2025-06-12 DIAGNOSIS — K22.70 BARRETT'S ESOPHAGUS WITHOUT DYSPLASIA: ICD-10-CM

## 2025-06-12 PROCEDURE — 43239 EGD BIOPSY SINGLE/MULTIPLE: CPT | Performed by: INTERNAL MEDICINE

## 2025-06-12 RX ORDER — LIDOCAINE HYDROCHLORIDE 20 MG/ML
INJECTION, SOLUTION INFILTRATION; PERINEURAL AS NEEDED
Status: DISCONTINUED | OUTPATIENT
Start: 2025-06-12 | End: 2025-06-12

## 2025-06-12 RX ORDER — PROPOFOL 10 MG/ML
INJECTION, EMULSION INTRAVENOUS AS NEEDED
Status: DISCONTINUED | OUTPATIENT
Start: 2025-06-12 | End: 2025-06-12

## 2025-06-12 RX ADMIN — PROPOFOL 50 MG: 10 INJECTION, EMULSION INTRAVENOUS at 11:14

## 2025-06-12 RX ADMIN — LIDOCAINE HYDROCHLORIDE 100 MG: 20 INJECTION, SOLUTION INFILTRATION; PERINEURAL at 11:08

## 2025-06-12 RX ADMIN — PROPOFOL 50 MG: 10 INJECTION, EMULSION INTRAVENOUS at 11:12

## 2025-06-12 RX ADMIN — PROPOFOL 100 MG: 10 INJECTION, EMULSION INTRAVENOUS at 11:09

## 2025-06-12 SDOH — HEALTH STABILITY: MENTAL HEALTH: CURRENT SMOKER: 0

## 2025-06-12 ASSESSMENT — PAIN - FUNCTIONAL ASSESSMENT
PAIN_FUNCTIONAL_ASSESSMENT: 0-10

## 2025-06-12 ASSESSMENT — COLUMBIA-SUICIDE SEVERITY RATING SCALE - C-SSRS
6. HAVE YOU EVER DONE ANYTHING, STARTED TO DO ANYTHING, OR PREPARED TO DO ANYTHING TO END YOUR LIFE?: NO
2. HAVE YOU ACTUALLY HAD ANY THOUGHTS OF KILLING YOURSELF?: NO
1. IN THE PAST MONTH, HAVE YOU WISHED YOU WERE DEAD OR WISHED YOU COULD GO TO SLEEP AND NOT WAKE UP?: NO

## 2025-06-12 ASSESSMENT — PAIN SCALES - GENERAL
PAINLEVEL_OUTOF10: 0 - NO PAIN
PAINLEVEL_OUTOF10: 0 - NO PAIN
PAIN_LEVEL: 0
PAINLEVEL_OUTOF10: 0 - NO PAIN
PAINLEVEL_OUTOF10: 0 - NO PAIN

## 2025-06-12 NOTE — ANESTHESIA PREPROCEDURE EVALUATION
Patient: Lj Bhardwaj    Procedure Information       Date/Time: 06/12/25 1130    Scheduled providers: Bola Sanchez MD    Procedure: EGD    Location: [facility]            Relevant Problems   Cardiac   (+) CAD (coronary artery disease)   (+) HLD (hyperlipidemia)   (+) Hypertension   (+) Peripheral vascular disease, unspecified      Neuro   (+) Meralgia paresthetica      GI   (+) GERD (gastroesophageal reflux disease)      Endocrine   (+) Class 2 severe obesity due to excess calories with serious comorbidity and body mass index (BMI) of 39.0 to 39.9 in adult      Skin   (+) Hand eczema       Clinical information reviewed:    Allergies  Meds               NPO Detail:  NPO/Void Status  Date of Last Liquid: 06/11/25  Time of Last Liquid: 2200  Date of Last Solid: 06/11/25  Time of Last Solid: 2100         Physical Exam    Airway  Mallampati: III  TM distance: >3 FB  Neck ROM: full     Cardiovascular   Rhythm: regular  Rate: normal     Dental    Pulmonary Breath sounds clear to auscultation     Abdominal (+) obese  Abdomen: soft  Bowel sounds: normal           Anesthesia Plan    History of general anesthesia?: yes  History of complications of general anesthesia?: no    ASA 2     MAC     The patient is not a current smoker.  Patient was not previously instructed to abstain from smoking on day of procedure.  Education provided regarding risk of obstructive sleep apnea.  intravenous induction   Anesthetic plan and risks discussed with patient.    Plan discussed with CRNA.

## 2025-06-12 NOTE — ANESTHESIA POSTPROCEDURE EVALUATION
Patient: Lj Bhardwaj    Procedure Summary       Date: 06/12/25 Room / Location: [facility]    Anesthesia Start: 1106 Anesthesia Stop: 1125    Procedure: EGD Diagnosis: Torres's esophagus without dysplasia    Scheduled Providers: Bola Sanchez MD Responsible Provider: KHOA Simms    Anesthesia Type: MAC ASA Status: 2            Anesthesia Type: MAC    Vitals Value Taken Time   /55 06/12/25 11:23   Temp 36.6 06/12/25 11:25   Pulse 51 06/12/25 11:23   Resp 14 06/12/25 11:23   SpO2 96 % 06/12/25 11:23       Anesthesia Post Evaluation    Patient location during evaluation: PACU  Patient participation: complete - patient participated  Level of consciousness: sleepy but conscious  Pain score: 0  Pain management: adequate  Airway patency: patent  Cardiovascular status: acceptable  Respiratory status: acceptable  Hydration status: acceptable  Postoperative Nausea and Vomiting: none        No notable events documented.

## 2025-06-12 NOTE — H&P
Procedure H&P    Patient Profile-Procedures  Name Lj Bhardwaj  Date of Birth 1955  MRN [id number]  Address   [address] [facility] [address] [facility] [id number] [facility] [address]    Primary Phone Number [phone]  Secondary Phone Number    Jennifer Peck    Procedure(s):  Procedures: EGD  Primary contact name and number   Extended Emergency Contact Information  Primary Emergency Contact: Ashwini Bhardwaj  Home Phone: [phone]  Work Phone: [phone]  Relation: Spouse    General Health  Weight   Vitals:    06/12/25 1053   Weight: 127 kg (280 lb)     BMI Body mass index is 35.95 kg/m².    Allergies  RX Allergies[1]    Past Medical History   Medical History[2]    Provider assessment  Diagnosis: GERD  Medication Reviewed - yes  Prior to Admission medications    Medication Sig Start Date End Date Taking? Authorizing Provider   aspirin 81 mg chewable tablet Chew 1 tablet (81 mg) once daily.   Yes Historical Provider, MD   atorvastatin (Lipitor) 80 mg tablet Take 1 tablet (80 mg) by mouth once daily. 1/8/25 1/8/26 Yes Jennifer Weir DO   cyclobenzaprine (Flexeril) 10 mg tablet Take 1 tablet (10 mg) by mouth 3 times a day as needed for muscle spasms. 4/29/24  Yes Jennifer Weir DO   lisinopril 40 mg tablet Take 1 tablet (40 mg) by mouth once daily. 1/8/25 1/8/26 Yes Jennifer Weir DO   omeprazole (PriLOSEC) 20 mg DR capsule Take by mouth once daily. 9/30/20  Yes Historical Provider, MD   diazePAM (Valium) 5 mg tablet Take 1 tablet (5 mg) by mouth every 8 hours if needed for muscle spasms. 4/29/24 1/8/25  Jennifer Weir DO       Physical Exam  Vitals:    06/12/25 1053   BP: 153/88   Pulse: 58   Resp: 16   Temp: 36.6 °C (97.9 °F)   SpO2: 99%        General: A&Ox3, NAD.  HEENT: AT/NC.   CV: RRR. No murmur.  Resp: CTA bilaterally. No wheezing, rhonchi or rales.   GI: Soft, NT/ND. BSx4.  Extrem: No edema. Pulses intact.  Neuro: No focal deficits.   Psych: Normal mood and  affect.      Procedure Plan - pre-procedural (re)assesment completed by physician:  discharge/transfer patient when discharge criteria met    ASA status 2  Mallampati score 3    Bola Sanchez MD  6/12/2025 11:07 AM           [1]   Allergies  Allergen Reactions    Latex Unknown   [2]   Past Medical History:  Diagnosis Date    Hypertension     Personal history of colonic polyps 10/08/2020    History of colonic polyps    Personal history of other diseases of the musculoskeletal system and connective tissue 09/30/2020    History of back pain

## 2025-06-12 NOTE — DISCHARGE INSTRUCTIONS

## 2025-06-25 LAB
LABORATORY COMMENT REPORT: NORMAL
PATH REPORT.FINAL DX SPEC: NORMAL
PATH REPORT.GROSS SPEC: NORMAL
PATH REPORT.TOTAL CANCER: NORMAL

## 2025-06-26 ENCOUNTER — APPOINTMENT (OUTPATIENT)
Dept: PRIMARY CARE | Facility: CLINIC | Age: 70
End: 2025-06-26
Payer: MEDICARE

## 2025-06-26 VITALS
RESPIRATION RATE: 14 BRPM | SYSTOLIC BLOOD PRESSURE: 148 MMHG | HEIGHT: 74 IN | BODY MASS INDEX: 34.65 KG/M2 | TEMPERATURE: 97.9 F | DIASTOLIC BLOOD PRESSURE: 90 MMHG | WEIGHT: 270 LBS | HEART RATE: 50 BPM | OXYGEN SATURATION: 97 %

## 2025-06-26 DIAGNOSIS — L30.9 ECZEMA OF BOTH HANDS: Primary | ICD-10-CM

## 2025-06-26 DIAGNOSIS — H61.23 BILATERAL IMPACTED CERUMEN: ICD-10-CM

## 2025-06-26 DIAGNOSIS — Z12.5 PROSTATE CANCER SCREENING: ICD-10-CM

## 2025-06-26 DIAGNOSIS — G47.33 OSA ON CPAP: ICD-10-CM

## 2025-06-26 DIAGNOSIS — M54.30 CHRONIC SCIATICA, UNSPECIFIED LATERALITY: ICD-10-CM

## 2025-06-26 DIAGNOSIS — I10 PRIMARY HYPERTENSION: ICD-10-CM

## 2025-06-26 DIAGNOSIS — M48.10 DISH (DIFFUSE IDIOPATHIC SKELETAL HYPEROSTOSIS): ICD-10-CM

## 2025-06-26 DIAGNOSIS — E78.2 MIXED HYPERLIPIDEMIA: ICD-10-CM

## 2025-06-26 DIAGNOSIS — Z00.00 ROUTINE GENERAL MEDICAL EXAMINATION AT HEALTH CARE FACILITY: ICD-10-CM

## 2025-06-26 PROCEDURE — 3080F DIAST BP >= 90 MM HG: CPT | Performed by: INTERNAL MEDICINE

## 2025-06-26 PROCEDURE — 99214 OFFICE O/P EST MOD 30 MIN: CPT | Performed by: INTERNAL MEDICINE

## 2025-06-26 PROCEDURE — G0439 PPPS, SUBSEQ VISIT: HCPCS | Performed by: INTERNAL MEDICINE

## 2025-06-26 PROCEDURE — 1159F MED LIST DOCD IN RCRD: CPT | Performed by: INTERNAL MEDICINE

## 2025-06-26 PROCEDURE — 3008F BODY MASS INDEX DOCD: CPT | Performed by: INTERNAL MEDICINE

## 2025-06-26 PROCEDURE — 1170F FXNL STATUS ASSESSED: CPT | Performed by: INTERNAL MEDICINE

## 2025-06-26 PROCEDURE — 3077F SYST BP >= 140 MM HG: CPT | Performed by: INTERNAL MEDICINE

## 2025-06-26 PROCEDURE — 1036F TOBACCO NON-USER: CPT | Performed by: INTERNAL MEDICINE

## 2025-06-26 RX ORDER — AMLODIPINE BESYLATE 5 MG/1
5 TABLET ORAL DAILY
Qty: 30 TABLET | Refills: 5 | Status: SHIPPED | OUTPATIENT
Start: 2025-06-26 | End: 2025-12-23

## 2025-06-26 RX ORDER — MAG HYDROX/ALUMINUM HYD/SIMETH 200-200-20
SUSPENSION, ORAL (FINAL DOSE FORM) ORAL 2 TIMES DAILY
Qty: 56 G | Refills: 1 | Status: SHIPPED | OUTPATIENT
Start: 2025-06-26

## 2025-06-26 ASSESSMENT — ACTIVITIES OF DAILY LIVING (ADL)
MANAGING_FINANCES: INDEPENDENT
DOING_HOUSEWORK: INDEPENDENT
TAKING_MEDICATION: INDEPENDENT
BATHING: INDEPENDENT
GROCERY_SHOPPING: INDEPENDENT
DRESSING: INDEPENDENT

## 2025-06-26 ASSESSMENT — PATIENT HEALTH QUESTIONNAIRE - PHQ9
2. FEELING DOWN, DEPRESSED OR HOPELESS: NOT AT ALL
SUM OF ALL RESPONSES TO PHQ9 QUESTIONS 1 AND 2: 0
1. LITTLE INTEREST OR PLEASURE IN DOING THINGS: NOT AT ALL

## 2025-06-26 ASSESSMENT — ENCOUNTER SYMPTOMS
OCCASIONAL FEELINGS OF UNSTEADINESS: 0
LOSS OF SENSATION IN FEET: 1
DEPRESSION: 0

## 2025-06-26 NOTE — PROGRESS NOTES
"Subjective    Lj Bhardwaj is a 70 y.o. male who presents for Medicare Annual Wellness Visit Subsequent.  HPI      Colonoscopy 5/2025    Flu 9/2024  Shingrix 2021  Tdap 2022  Pneumovax 2020  Prevnar 2019    Referral to Ent ear his hearing is     Started part time job 2 months ago. Works in a warehouse.   Can heat impact overall health?  Wears gloves at work. Has rash on hands. Has hx of eczema  of hands.  He does not have drug coverage  He drinks a lot of water at work. They also provide frozen electrolytes and he can rest at any time.  He likes it.     B/l sciatic pain -chronic  Weight is down.     Review of Systems   All other systems reviewed and are negative.        Objective     /90 (BP Location: Left arm, Patient Position: Sitting, BP Cuff Size: Adult)   Pulse 50   Temp 36.6 °C (97.9 °F) (Skin)   Resp 14   Ht 1.88 m (6' 2\")   Wt 122 kg (270 lb)   SpO2 97%   BMI 34.67 kg/m²    Physical Exam  Vitals and nursing note reviewed.   Constitutional:       Appearance: Normal appearance.   Neck:      Thyroid: No thyroid mass or thyromegaly.   Cardiovascular:      Rate and Rhythm: Normal rate and regular rhythm.      Pulses: Normal pulses.      Heart sounds: Normal heart sounds.   Pulmonary:      Effort: Pulmonary effort is normal.      Breath sounds: Normal breath sounds.   Abdominal:      General: Abdomen is flat. Bowel sounds are normal.      Palpations: Abdomen is soft.   Musculoskeletal:      Cervical back: Normal range of motion and neck supple.   Neurological:      General: No focal deficit present.      Mental Status: He is alert.   Psychiatric:         Mood and Affect: Mood normal.       Health Maintenance Due   Topic Date Due    Hepatitis C Screening  Never done    RSV High Risk: (Elderly (60+) or Pregnant Population) (1 - Risk 60-74 years 1-dose series) Never done    COVID-19 Vaccine (8 - 2024-25 season) 03/13/2025          Assessment/Plan   Problem List Items Addressed This Visit       HLD " (hyperlipidemia)    Relevant Orders    Comprehensive Metabolic Panel    Lipid Panel    NICOLAS on CPAP    Hypertension    Relevant Medications    amLODIPine (Norvasc) 5 mg tablet    Other Relevant Orders    CBC    DISH (diffuse idiopathic skeletal hyperostosis)     Other Visit Diagnoses         Eczema of both hands    -  Primary    Relevant Medications    hydrocortisone 1 % ointment      Prostate cancer screening        Relevant Orders    Prostate Specific Antigen      Bilateral impacted cerumen        Relevant Orders    Referral to ENT      Routine general medical examination at health care facility        Relevant Orders    1 Year Follow Up In [facility] - PCP - Wellness Exam      Chronic sciatica, unspecified laterality            Pt states they are using >4 hours a night more than 70% of the nights. Pt has noticed a significant  Improvement in symptoms.   Weight is down. He is more active. As long as he takes breaks and remains hydrated and he does not get dizzy or chest pain I think he will benefit from a more physically active job. Stretch as often as he can for his sciatic  Check labs  His blood pressure is a little elevated will add amlodipine.   Side effects discussed.  Refer to ent for ear wax removal. He is nervous to have irrigation as he has had bad vertigo from that  Call  with any problems or questions.   Follow up in 3 months.

## 2025-07-11 ENCOUNTER — APPOINTMENT (OUTPATIENT)
Dept: OTOLARYNGOLOGY | Facility: CLINIC | Age: 70
End: 2025-07-11
Payer: MEDICARE

## 2025-07-11 DIAGNOSIS — H61.23 BILATERAL IMPACTED CERUMEN: ICD-10-CM

## 2025-07-11 PROCEDURE — 1160F RVW MEDS BY RX/DR IN RCRD: CPT

## 2025-07-11 PROCEDURE — 69210 REMOVE IMPACTED EAR WAX UNI: CPT

## 2025-07-11 PROCEDURE — 1159F MED LIST DOCD IN RCRD: CPT

## 2025-07-11 NOTE — PROGRESS NOTES
Subjective   Patient ID: Lj Bhardwaj is a 70 y.o. male who presents for Cerumen Impaction.    HPI  New patient being seen for evaluation of ears. History of cerumen impaction, here today for removal of same. Was recently seen by PCP who noted right ear impacted cerumen. He denies otalgia, otorrhea, tinnitus, decreased hearing, dizziness, vertigo. All remaining head neck inquiry otherwise negative.     Physical Exam & Procedure:  Bilateral impacted cerumen removed under otomicroscopic examination using suction, curette, and forceps. Patient tolerated procedure without difficulty. Following removal, TMs are intact with no sign of infection, effusion, retraction, or perforation.     Assessment/Plan   Patient presents for evaluation of cerumen impaction. The ears were cleaned using otomicroscope and instrumentation.  Patient tolerated the procedure well. All questions were answered to patient's satisfaction. Patient to follow-up as needed.

## 2025-07-15 LAB
ALBUMIN SERPL-MCNC: 4.3 G/DL (ref 3.6–5.1)
ALP SERPL-CCNC: 81 U/L (ref 35–144)
ALT SERPL-CCNC: 28 U/L (ref 9–46)
ANION GAP SERPL CALCULATED.4IONS-SCNC: 8 MMOL/L (CALC) (ref 7–17)
AST SERPL-CCNC: 23 U/L (ref 10–35)
BILIRUB SERPL-MCNC: 1.2 MG/DL (ref 0.2–1.2)
BUN SERPL-MCNC: 24 MG/DL (ref 7–25)
CALCIUM SERPL-MCNC: 9.4 MG/DL (ref 8.6–10.3)
CHLORIDE SERPL-SCNC: 103 MMOL/L (ref 98–110)
CHOLEST SERPL-MCNC: 119 MG/DL
CHOLEST/HDLC SERPL: 3.1 (CALC)
CO2 SERPL-SCNC: 26 MMOL/L (ref 20–32)
CREAT SERPL-MCNC: 0.81 MG/DL (ref 0.7–1.28)
EGFRCR SERPLBLD CKD-EPI 2021: 94 ML/MIN/1.73M2
ERYTHROCYTE [DISTWIDTH] IN BLOOD BY AUTOMATED COUNT: 13.1 % (ref 11–15)
GLUCOSE SERPL-MCNC: 101 MG/DL (ref 65–99)
HCT VFR BLD AUTO: 46.5 % (ref 38.5–50)
HDLC SERPL-MCNC: 38 MG/DL
HGB BLD-MCNC: 15.1 G/DL (ref 13.2–17.1)
LDLC SERPL CALC-MCNC: 62 MG/DL (CALC)
MCH RBC QN AUTO: 29.2 PG (ref 27–33)
MCHC RBC AUTO-ENTMCNC: 32.5 G/DL (ref 32–36)
MCV RBC AUTO: 89.9 FL (ref 80–100)
NONHDLC SERPL-MCNC: 81 MG/DL (CALC)
PLATELET # BLD AUTO: 226 THOUSAND/UL (ref 140–400)
PMV BLD REES-ECKER: 9.8 FL (ref 7.5–12.5)
POTASSIUM SERPL-SCNC: 4.9 MMOL/L (ref 3.5–5.3)
PROT SERPL-MCNC: 7.6 G/DL (ref 6.1–8.1)
PSA SERPL-MCNC: 1.28 NG/ML
RBC # BLD AUTO: 5.17 MILLION/UL (ref 4.2–5.8)
SODIUM SERPL-SCNC: 137 MMOL/L (ref 135–146)
TRIGL SERPL-MCNC: 109 MG/DL
WBC # BLD AUTO: 5.5 THOUSAND/UL (ref 3.8–10.8)

## 2025-10-02 ENCOUNTER — APPOINTMENT (OUTPATIENT)
Dept: PRIMARY CARE | Facility: CLINIC | Age: 70
End: 2025-10-02
Payer: MEDICARE

## 2026-07-09 ENCOUNTER — APPOINTMENT (OUTPATIENT)
Dept: OTOLARYNGOLOGY | Facility: CLINIC | Age: 71
End: 2026-07-09
Payer: MEDICARE